# Patient Record
Sex: FEMALE | Race: WHITE | NOT HISPANIC OR LATINO | Employment: UNEMPLOYED | URBAN - METROPOLITAN AREA
[De-identification: names, ages, dates, MRNs, and addresses within clinical notes are randomized per-mention and may not be internally consistent; named-entity substitution may affect disease eponyms.]

---

## 2019-05-16 ENCOUNTER — OFFICE VISIT (OUTPATIENT)
Dept: URGENT CARE | Facility: CLINIC | Age: 39
End: 2019-05-16
Payer: OTHER GOVERNMENT

## 2019-05-16 VITALS
HEART RATE: 80 BPM | SYSTOLIC BLOOD PRESSURE: 114 MMHG | RESPIRATION RATE: 20 BRPM | OXYGEN SATURATION: 99 % | TEMPERATURE: 98 F | DIASTOLIC BLOOD PRESSURE: 70 MMHG

## 2019-05-16 DIAGNOSIS — H60.502 ACUTE OTITIS EXTERNA OF LEFT EAR, UNSPECIFIED TYPE: ICD-10-CM

## 2019-05-16 DIAGNOSIS — H65.92 LEFT SEROUS OTITIS MEDIA, UNSPECIFIED CHRONICITY: Primary | ICD-10-CM

## 2019-05-16 PROCEDURE — 99213 OFFICE O/P EST LOW 20 MIN: CPT | Performed by: PHYSICIAN ASSISTANT

## 2019-05-16 RX ORDER — AMOXICILLIN 875 MG/1
875 TABLET, COATED ORAL 2 TIMES DAILY
Qty: 20 TABLET | Refills: 0 | Status: SHIPPED | OUTPATIENT
Start: 2019-05-16 | End: 2019-05-23

## 2019-05-16 RX ORDER — OFLOXACIN 3 MG/ML
10 SOLUTION AURICULAR (OTIC) DAILY
Qty: 5 ML | Refills: 0 | Status: SHIPPED | OUTPATIENT
Start: 2019-05-16 | End: 2019-06-12 | Stop reason: ALTCHOICE

## 2019-06-06 ENCOUNTER — OFFICE VISIT (OUTPATIENT)
Dept: URGENT CARE | Facility: CLINIC | Age: 39
End: 2019-06-06
Payer: OTHER GOVERNMENT

## 2019-06-06 VITALS
SYSTOLIC BLOOD PRESSURE: 125 MMHG | RESPIRATION RATE: 18 BRPM | DIASTOLIC BLOOD PRESSURE: 70 MMHG | HEART RATE: 92 BPM | OXYGEN SATURATION: 100 % | TEMPERATURE: 98.3 F

## 2019-06-06 DIAGNOSIS — B37.0 THRUSH: Primary | ICD-10-CM

## 2019-06-06 PROCEDURE — 87102 FUNGUS ISOLATION CULTURE: CPT | Performed by: PHYSICIAN ASSISTANT

## 2019-06-06 PROCEDURE — G0382 LEV 3 HOSP TYPE B ED VISIT: HCPCS | Performed by: PHYSICIAN ASSISTANT

## 2019-06-06 PROCEDURE — 87106 FUNGI IDENTIFICATION YEAST: CPT | Performed by: PHYSICIAN ASSISTANT

## 2019-06-12 ENCOUNTER — OFFICE VISIT (OUTPATIENT)
Dept: INTERNAL MEDICINE CLINIC | Facility: CLINIC | Age: 39
End: 2019-06-12
Payer: OTHER GOVERNMENT

## 2019-06-12 VITALS
WEIGHT: 143.8 LBS | TEMPERATURE: 97.6 F | OXYGEN SATURATION: 96 % | RESPIRATION RATE: 16 BRPM | HEIGHT: 67 IN | BODY MASS INDEX: 22.57 KG/M2 | HEART RATE: 83 BPM | SYSTOLIC BLOOD PRESSURE: 112 MMHG | DIASTOLIC BLOOD PRESSURE: 72 MMHG

## 2019-06-12 DIAGNOSIS — Z12.31 ENCOUNTER FOR SCREENING MAMMOGRAM FOR BREAST CANCER: ICD-10-CM

## 2019-06-12 DIAGNOSIS — E55.9 VITAMIN D DEFICIENCY: ICD-10-CM

## 2019-06-12 DIAGNOSIS — T78.40XD ALLERGIC STATE, SUBSEQUENT ENCOUNTER: ICD-10-CM

## 2019-06-12 DIAGNOSIS — Z98.84 H/O GASTRIC BYPASS: ICD-10-CM

## 2019-06-12 DIAGNOSIS — Z13.220 SCREENING FOR LIPID DISORDERS: ICD-10-CM

## 2019-06-12 DIAGNOSIS — Z00.00 ENCOUNTER FOR PREVENTATIVE ADULT HEALTH CARE EXAMINATION: Primary | ICD-10-CM

## 2019-06-12 DIAGNOSIS — Z12.4 SCREENING FOR CERVICAL CANCER: ICD-10-CM

## 2019-06-12 DIAGNOSIS — Z13.0 SCREENING FOR DEFICIENCY ANEMIA: ICD-10-CM

## 2019-06-12 DIAGNOSIS — Z13.1 SCREENING FOR DIABETES MELLITUS: ICD-10-CM

## 2019-06-12 DIAGNOSIS — G25.81 RESTLESS LEG: ICD-10-CM

## 2019-06-12 DIAGNOSIS — Z13.29 SCREENING FOR THYROID DISORDER: ICD-10-CM

## 2019-06-12 DIAGNOSIS — M79.7 FIBROMYALGIA: ICD-10-CM

## 2019-06-12 DIAGNOSIS — F17.200 TOBACCO USE DISORDER: ICD-10-CM

## 2019-06-12 PROBLEM — G47.09 OTHER INSOMNIA: Status: ACTIVE | Noted: 2019-06-12

## 2019-06-12 PROBLEM — Z90.49 HISTORY OF CHOLECYSTECTOMY: Status: ACTIVE | Noted: 2019-06-12

## 2019-06-12 LAB — FUNGUS SPEC CULT: ABNORMAL

## 2019-06-12 PROCEDURE — 99204 OFFICE O/P NEW MOD 45 MIN: CPT | Performed by: NURSE PRACTITIONER

## 2019-06-12 RX ORDER — FLUTICASONE PROPIONATE 50 MCG
1 SPRAY, SUSPENSION (ML) NASAL DAILY
Qty: 1 BOTTLE | Refills: 5 | Status: SHIPPED | OUTPATIENT
Start: 2019-06-12 | End: 2020-01-27

## 2019-06-12 RX ORDER — DIPHENOXYLATE HYDROCHLORIDE AND ATROPINE SULFATE 2.5; .025 MG/1; MG/1
1 TABLET ORAL DAILY
COMMUNITY

## 2019-06-12 RX ORDER — MONTELUKAST SODIUM 10 MG/1
10 TABLET ORAL
Qty: 90 TABLET | Refills: 3 | Status: SHIPPED | OUTPATIENT
Start: 2019-06-12 | End: 2020-02-21 | Stop reason: ALTCHOICE

## 2019-06-12 RX ORDER — GREEN TEA/HOODIA GORDONII 315-12.5MG
CAPSULE ORAL
COMMUNITY

## 2019-06-12 RX ORDER — LORATADINE 10 MG/1
10 TABLET ORAL DAILY
Qty: 30 TABLET | Refills: 3 | Status: SHIPPED | OUTPATIENT
Start: 2019-06-12 | End: 2020-01-27 | Stop reason: SDUPTHER

## 2019-06-13 ENCOUNTER — TELEPHONE (OUTPATIENT)
Dept: INTERNAL MEDICINE CLINIC | Facility: CLINIC | Age: 39
End: 2019-06-13

## 2019-06-13 DIAGNOSIS — B37.0 THRUSH: ICD-10-CM

## 2019-06-14 DIAGNOSIS — B37.0 THRUSH: Primary | ICD-10-CM

## 2019-10-10 ENCOUNTER — OFFICE VISIT (OUTPATIENT)
Dept: INTERNAL MEDICINE CLINIC | Facility: CLINIC | Age: 39
End: 2019-10-10
Payer: OTHER GOVERNMENT

## 2019-10-10 VITALS
DIASTOLIC BLOOD PRESSURE: 90 MMHG | OXYGEN SATURATION: 98 % | WEIGHT: 145.38 LBS | SYSTOLIC BLOOD PRESSURE: 100 MMHG | BODY MASS INDEX: 22.82 KG/M2 | TEMPERATURE: 97.3 F | HEIGHT: 67 IN | HEART RATE: 108 BPM

## 2019-10-10 DIAGNOSIS — R14.0 ABDOMINAL BLOATING: ICD-10-CM

## 2019-10-10 DIAGNOSIS — K52.9 CHRONIC DIARRHEA: ICD-10-CM

## 2019-10-10 DIAGNOSIS — Z13.220 SCREENING FOR LIPID DISORDERS: ICD-10-CM

## 2019-10-10 DIAGNOSIS — Z98.84 S/P GASTRIC BYPASS: ICD-10-CM

## 2019-10-10 DIAGNOSIS — R19.5 CHANGE IN STOOL: Primary | ICD-10-CM

## 2019-10-10 PROCEDURE — 99214 OFFICE O/P EST MOD 30 MIN: CPT | Performed by: INTERNAL MEDICINE

## 2019-10-10 RX ORDER — CHOLESTYRAMINE 4 G/9G
2 POWDER, FOR SUSPENSION ORAL
Qty: 378 G | Refills: 1 | Status: SHIPPED | OUTPATIENT
Start: 2019-10-10 | End: 2020-02-21 | Stop reason: ALTCHOICE

## 2019-10-10 NOTE — PROGRESS NOTES
Assessment/Plan:  Problem List Items Addressed This Visit     None      Visit Diagnoses     Change in stool    -  Primary    Relevant Medications    cholestyramine (QUESTRAN) 4 GM/DOSE powder    Other Relevant Orders    CBC and differential    Comprehensive metabolic panel    TSH, 3rd generation    Celiac Disease Antibody Profile    XR abdomen obstruction series    Chronic diarrhea        Relevant Medications    cholestyramine (QUESTRAN) 4 GM/DOSE powder    Other Relevant Orders    CBC and differential    Comprehensive metabolic panel    Celiac Disease Antibody Profile    Clostridium difficile toxin by PCR    Fecal fat, qualitative    Fecal fat, quantitative    Fecal leukocytes    Giardia antigen    Ova and parasite examination    Stool Enteric Bacterial Panel by PCR    White Blood Cells, Stool by Gram Stain    XR abdomen obstruction series    S/P gastric bypass        Relevant Orders    Ferritin    Iron    TIBC    Vitamin A    Vitamin B1, whole blood    Vitamin B12/Folate, Serum Panel    Vitamin B6    Vitamin D 25 hydroxy    XR abdomen obstruction series    Abdominal bloating        Relevant Medications    cholestyramine (QUESTRAN) 4 GM/DOSE powder    Other Relevant Orders    XR abdomen obstruction series    Screening for lipid disorders        Relevant Orders    Lipid Panel with Direct LDL reflex           Diagnoses and all orders for this visit:    Change in stool  -     CBC and differential; Future  -     Comprehensive metabolic panel; Future  -     TSH, 3rd generation; Future  -     Celiac Disease Antibody Profile; Future  -     XR abdomen obstruction series; Future  -     cholestyramine (QUESTRAN) 4 GM/DOSE powder; Take 0 5 packets (2 g total) by mouth 3 (three) times a day with meals    Chronic diarrhea  -     CBC and differential; Future  -     Comprehensive metabolic panel; Future  -     Celiac Disease Antibody Profile;  Future  -     Clostridium difficile toxin by PCR  -     Fecal fat, qualitative; Future  -     Fecal fat, quantitative; Future  -     Fecal leukocytes; Future  -     Giardia antigen; Future  -     Ova and parasite examination; Future  -     Stool Enteric Bacterial Panel by PCR; Future  -     White Blood Cells, Stool by Gram Stain; Future  -     XR abdomen obstruction series; Future  -     cholestyramine (QUESTRAN) 4 GM/DOSE powder; Take 0 5 packets (2 g total) by mouth 3 (three) times a day with meals    S/P gastric bypass  -     Ferritin; Future  -     Iron; Future  -     TIBC; Future  -     Vitamin A; Future  -     Vitamin B1, whole blood; Future  -     Vitamin B12/Folate, Serum Panel; Future  -     Vitamin B6; Future  -     Vitamin D 25 hydroxy; Future  -     XR abdomen obstruction series; Future    Abdominal bloating  -     XR abdomen obstruction series; Future  -     cholestyramine (QUESTRAN) 4 GM/DOSE powder; Take 0 5 packets (2 g total) by mouth 3 (three) times a day with meals    Screening for lipid disorders  -     Lipid Panel with Direct LDL reflex; Future        No problem-specific Assessment & Plan notes found for this encounter  A/P: ??Cause  PE unimpressive  Late for post choly sydrome to start, but still in the DDx  ? infectious, malabsorption(celiac), IBD or given the fibro, IBS or dumping syndrome from the bypass, but would have expected this closer to the sx date as well  Doubt obstruction  Will check labs including routine and for gastric bypass pt since she is way overdue, for diarrhea, along with stool studies  Check an obstruction series  Trial of questran  May need a colonoscopy or BE  RTC two weeks for f/u  Subjective:      Patient ID: Mike Borrero is a 35 y o  female  WF with h/o gastric bypass, s/p choly  and fibromyalgia, presents with  a5-6 month h/o stool changes with associated abdominal bloating and cramping  Mainly at night  Reports several stools with the initial one being very soft and formed, but the following being watery diarrhea   Reports foul smelling and foamy, but no greasy or fatty deposits  No BRBPR, melena, hematochezia, etc  No fever or chills  No n/v  Lots of flatulence  NO one else affected  Drinks bottled water  No abx use  No change in diet or meds  Has not had labs in a long time  No Fhx of IBD/IBS, but some celiac disease  Bypass and choly years ago  No wt changes  No relationship to certain foods  Denies any MATTIE changes  Has tried [employer] and OTC and anti-diarrhea meds w/o relief  The following portions of the patient's history were reviewed and updated as appropriate:   She has a past medical history of Fibromyalgia and Restless leg syndrome  ,  does not have any pertinent problems on file  ,   has a past surgical history that includes Cholecystectomy and Gastric bypass  ,  family history includes Anxiety disorder in her mother; Dementia in her paternal grandmother; Heart disease in her mother; No Known Problems in her father  ,   reports that she has been smoking  She has never used smokeless tobacco  She reports that she drank alcohol  She reports that she does not use drugs  ,  has No Known Allergies     Current Outpatient Medications   Medication Sig Dispense Refill    cholestyramine (QUESTRAN) 4 GM/DOSE powder Take 0 5 packets (2 g total) by mouth 3 (three) times a day with meals 378 g 1    fluticasone (FLONASE) 50 mcg/act nasal spray 1 spray into each nostril daily for 30 days 1 Bottle 5    Lactobacillus (PROBIOTIC ACIDOPHILUS) TABS Take by mouth      loratadine (CLARITIN) 10 mg tablet Take 1 tablet (10 mg total) by mouth daily for 30 days 30 tablet 3    montelukast (SINGULAIR) 10 mg tablet Take 1 tablet (10 mg total) by mouth daily at bedtime for 90 days 90 tablet 3    multivitamin (THERAGRAN) TABS Take 1 tablet by mouth daily       No current facility-administered medications for this visit  Review of Systems   Constitutional: Negative for activity change, chills, diaphoresis, fatigue and fever     Respiratory: Negative for cough, chest tightness, shortness of breath and wheezing  Cardiovascular: Negative for chest pain, palpitations and leg swelling  Gastrointestinal: Positive for abdominal distention, abdominal pain and diarrhea  Negative for anal bleeding, blood in stool, constipation, nausea, rectal pain and vomiting  Endocrine: Negative for cold intolerance and heat intolerance  Genitourinary: Negative for difficulty urinating, dysuria and frequency  Musculoskeletal: Negative for arthralgias, gait problem and myalgias  Neurological: Negative for dizziness, tremors, seizures, weakness, light-headedness and headaches  Psychiatric/Behavioral: Positive for sleep disturbance  Negative for confusion and dysphoric mood  The patient is not nervous/anxious  PHQ-9 Depression Screening    PHQ-9:    Frequency of the following problems over the past two weeks:       Little interest or pleasure in doing things:  0 - not at all  Feeling down, depressed, or hopeless:  0 - not at all  PHQ-2 Score:  0        Objective:  Vitals:    10/10/19 1530   BP: 100/90   Pulse: (!) 108   Temp: (!) 97 3 °F (36 3 °C)   SpO2: 98%   Weight: 65 9 kg (145 lb 6 oz)   Height: 5' 6 5" (1 689 m)     Body mass index is 23 11 kg/m²  Physical Exam   Constitutional: She is oriented to person, place, and time  She appears well-developed and well-nourished  No distress  HENT:   Head: Normocephalic and atraumatic  Mouth/Throat: Oropharynx is clear and moist    Eyes: Pupils are equal, round, and reactive to light  Conjunctivae and EOM are normal  No scleral icterus  Neck: Neck supple  Cardiovascular: Normal rate, regular rhythm and normal heart sounds  Pulmonary/Chest: Effort normal and breath sounds normal  No respiratory distress  She has no wheezes  She has no rales  Abdominal: Soft  Bowel sounds are normal  She exhibits no distension and no mass  There is no tenderness  There is no rebound and no guarding  Lymphadenopathy:     She has no cervical adenopathy  Neurological: She is alert and oriented to person, place, and time  Psychiatric: She has a normal mood and affect  Her behavior is normal  Judgment and thought content normal    Nursing note and vitals reviewed

## 2019-10-11 ENCOUNTER — APPOINTMENT (OUTPATIENT)
Dept: LAB | Facility: CLINIC | Age: 39
End: 2019-10-11
Payer: OTHER GOVERNMENT

## 2019-10-11 ENCOUNTER — APPOINTMENT (OUTPATIENT)
Dept: RADIOLOGY | Facility: CLINIC | Age: 39
End: 2019-10-11
Payer: OTHER GOVERNMENT

## 2019-10-11 ENCOUNTER — TRANSCRIBE ORDERS (OUTPATIENT)
Dept: LAB | Facility: CLINIC | Age: 39
End: 2019-10-11

## 2019-10-11 DIAGNOSIS — Z98.84 S/P GASTRIC BYPASS: ICD-10-CM

## 2019-10-11 DIAGNOSIS — Z13.29 SCREENING FOR THYROID DISORDER: ICD-10-CM

## 2019-10-11 DIAGNOSIS — K52.9 CHRONIC DIARRHEA: ICD-10-CM

## 2019-10-11 DIAGNOSIS — R19.5 CHANGE IN STOOL: ICD-10-CM

## 2019-10-11 DIAGNOSIS — R14.0 ABDOMINAL BLOATING: ICD-10-CM

## 2019-10-11 DIAGNOSIS — Z13.220 SCREENING FOR LIPID DISORDERS: ICD-10-CM

## 2019-10-11 DIAGNOSIS — Z98.84 S/P GASTRIC BYPASS: Primary | ICD-10-CM

## 2019-10-11 LAB
25(OH)D3 SERPL-MCNC: 47.7 NG/ML (ref 30–100)
ALBUMIN SERPL BCP-MCNC: 3.6 G/DL (ref 3.5–5)
ALP SERPL-CCNC: 99 U/L (ref 46–116)
ALT SERPL W P-5'-P-CCNC: 23 U/L (ref 12–78)
ANION GAP SERPL CALCULATED.3IONS-SCNC: 7 MMOL/L (ref 4–13)
AST SERPL W P-5'-P-CCNC: 11 U/L (ref 5–45)
BASOPHILS # BLD AUTO: 0.06 THOUSANDS/ΜL (ref 0–0.1)
BASOPHILS NFR BLD AUTO: 1 % (ref 0–1)
BILIRUB SERPL-MCNC: 0.38 MG/DL (ref 0.2–1)
BUN SERPL-MCNC: 9 MG/DL (ref 5–25)
CALCIUM SERPL-MCNC: 9 MG/DL (ref 8.3–10.1)
CHLORIDE SERPL-SCNC: 107 MMOL/L (ref 100–108)
CHOLEST SERPL-MCNC: 120 MG/DL (ref 50–200)
CO2 SERPL-SCNC: 26 MMOL/L (ref 21–32)
CREAT SERPL-MCNC: 0.69 MG/DL (ref 0.6–1.3)
EOSINOPHIL # BLD AUTO: 0.09 THOUSAND/ΜL (ref 0–0.61)
EOSINOPHIL NFR BLD AUTO: 1 % (ref 0–6)
ERYTHROCYTE [DISTWIDTH] IN BLOOD BY AUTOMATED COUNT: 21.5 % (ref 11.6–15.1)
FERRITIN SERPL-MCNC: 3 NG/ML (ref 8–388)
FOLATE SERPL-MCNC: >20 NG/ML (ref 3.1–17.5)
GFR SERPL CREATININE-BSD FRML MDRD: 109 ML/MIN/1.73SQ M
GLUCOSE P FAST SERPL-MCNC: 84 MG/DL (ref 65–99)
HCT VFR BLD AUTO: 33 % (ref 34.8–46.1)
HDLC SERPL-MCNC: 45 MG/DL (ref 40–60)
HGB BLD-MCNC: 9.6 G/DL (ref 11.5–15.4)
IMM GRANULOCYTES # BLD AUTO: 0.02 THOUSAND/UL (ref 0–0.2)
IMM GRANULOCYTES NFR BLD AUTO: 0 % (ref 0–2)
IRON SERPL-MCNC: 14 UG/DL (ref 50–170)
LDLC SERPL CALC-MCNC: 65 MG/DL (ref 0–100)
LYMPHOCYTES # BLD AUTO: 3.95 THOUSANDS/ΜL (ref 0.6–4.47)
LYMPHOCYTES NFR BLD AUTO: 44 % (ref 14–44)
MCH RBC QN AUTO: 20.2 PG (ref 26.8–34.3)
MCHC RBC AUTO-ENTMCNC: 29.1 G/DL (ref 31.4–37.4)
MCV RBC AUTO: 69 FL (ref 82–98)
MONOCYTES # BLD AUTO: 0.46 THOUSAND/ΜL (ref 0.17–1.22)
MONOCYTES NFR BLD AUTO: 5 % (ref 4–12)
NEUTROPHILS # BLD AUTO: 4.36 THOUSANDS/ΜL (ref 1.85–7.62)
NEUTS SEG NFR BLD AUTO: 49 % (ref 43–75)
NRBC BLD AUTO-RTO: 0 /100 WBCS
PLATELET # BLD AUTO: 249 THOUSANDS/UL (ref 149–390)
PMV BLD AUTO: 10.7 FL (ref 8.9–12.7)
POTASSIUM SERPL-SCNC: 3.4 MMOL/L (ref 3.5–5.3)
PROT SERPL-MCNC: 7.3 G/DL (ref 6.4–8.2)
RBC # BLD AUTO: 4.76 MILLION/UL (ref 3.81–5.12)
SODIUM SERPL-SCNC: 140 MMOL/L (ref 136–145)
TIBC SERPL-MCNC: 411 UG/DL (ref 250–450)
TRIGL SERPL-MCNC: 48 MG/DL
TSH SERPL DL<=0.05 MIU/L-ACNC: 3.29 UIU/ML (ref 0.36–3.74)
VIT B12 SERPL-MCNC: 356 PG/ML (ref 100–900)
WBC # BLD AUTO: 8.94 THOUSAND/UL (ref 4.31–10.16)

## 2019-10-11 PROCEDURE — 74022 RADEX COMPL AQT ABD SERIES: CPT

## 2019-10-11 PROCEDURE — 80061 LIPID PANEL: CPT

## 2019-10-11 PROCEDURE — 84590 ASSAY OF VITAMIN A: CPT

## 2019-10-11 PROCEDURE — 36415 COLL VENOUS BLD VENIPUNCTURE: CPT

## 2019-10-11 PROCEDURE — 84425 ASSAY OF VITAMIN B-1: CPT

## 2019-10-11 PROCEDURE — 83550 IRON BINDING TEST: CPT

## 2019-10-11 PROCEDURE — 82746 ASSAY OF FOLIC ACID SERUM: CPT

## 2019-10-11 PROCEDURE — 82728 ASSAY OF FERRITIN: CPT

## 2019-10-11 PROCEDURE — 82306 VITAMIN D 25 HYDROXY: CPT

## 2019-10-11 PROCEDURE — 83540 ASSAY OF IRON: CPT

## 2019-10-11 PROCEDURE — 84207 ASSAY OF VITAMIN B-6: CPT

## 2019-10-11 PROCEDURE — 86255 FLUORESCENT ANTIBODY SCREEN: CPT

## 2019-10-11 PROCEDURE — 82784 ASSAY IGA/IGD/IGG/IGM EACH: CPT

## 2019-10-11 PROCEDURE — 83516 IMMUNOASSAY NONANTIBODY: CPT

## 2019-10-11 PROCEDURE — 85025 COMPLETE CBC W/AUTO DIFF WBC: CPT

## 2019-10-11 PROCEDURE — 80053 COMPREHEN METABOLIC PANEL: CPT

## 2019-10-11 PROCEDURE — 82607 VITAMIN B-12: CPT

## 2019-10-11 PROCEDURE — 84443 ASSAY THYROID STIM HORMONE: CPT

## 2019-10-12 LAB
ENDOMYSIUM IGA SER QL: NEGATIVE
GLIADIN PEPTIDE IGA SER-ACNC: 11 UNITS (ref 0–19)
GLIADIN PEPTIDE IGG SER-ACNC: 3 UNITS (ref 0–19)
IGA SERPL-MCNC: 339 MG/DL (ref 87–352)
TTG IGA SER-ACNC: <2 U/ML (ref 0–3)
TTG IGG SER-ACNC: <2 U/ML (ref 0–5)

## 2019-10-15 LAB
VIT A SERPL-MCNC: 34.2 UG/DL (ref 20.1–62)
VIT B6 SERPL-MCNC: 7.2 UG/L (ref 2–32.8)

## 2019-10-16 LAB — VIT B1 BLD-SCNC: 138.8 NMOL/L (ref 66.5–200)

## 2019-10-25 ENCOUNTER — OFFICE VISIT (OUTPATIENT)
Dept: INTERNAL MEDICINE CLINIC | Facility: CLINIC | Age: 39
End: 2019-10-25
Payer: OTHER GOVERNMENT

## 2019-10-25 VITALS
BODY MASS INDEX: 22.13 KG/M2 | WEIGHT: 141 LBS | DIASTOLIC BLOOD PRESSURE: 62 MMHG | RESPIRATION RATE: 14 BRPM | TEMPERATURE: 97.6 F | HEIGHT: 67 IN | SYSTOLIC BLOOD PRESSURE: 110 MMHG | HEART RATE: 72 BPM

## 2019-10-25 DIAGNOSIS — K58.0 IRRITABLE BOWEL SYNDROME WITH DIARRHEA: ICD-10-CM

## 2019-10-25 DIAGNOSIS — D50.8 IRON DEFICIENCY ANEMIA SECONDARY TO INADEQUATE DIETARY IRON INTAKE: Primary | ICD-10-CM

## 2019-10-25 DIAGNOSIS — E87.6 HYPOKALEMIA: ICD-10-CM

## 2019-10-25 DIAGNOSIS — Z98.84 H/O GASTRIC BYPASS: ICD-10-CM

## 2019-10-25 PROCEDURE — 99214 OFFICE O/P EST MOD 30 MIN: CPT | Performed by: INTERNAL MEDICINE

## 2019-10-25 RX ORDER — FERROUS SULFATE TAB EC 324 MG (65 MG FE EQUIVALENT) 324 (65 FE) MG
324 TABLET DELAYED RESPONSE ORAL
Qty: 180 TABLET | Refills: 1 | Status: SHIPPED | OUTPATIENT
Start: 2019-10-25 | End: 2020-02-27 | Stop reason: ALTCHOICE

## 2019-10-25 NOTE — PROGRESS NOTES
Assessment/Plan:  Problem List Items Addressed This Visit        Other    H/O gastric bypass    Relevant Medications    ferrous sulfate 324 (65 Fe) mg      Other Visit Diagnoses     Iron deficiency anemia secondary to inadequate dietary iron intake    -  Primary    Relevant Medications    ferrous sulfate 324 (65 Fe) mg    Hypokalemia        Irritable bowel syndrome with diarrhea               Diagnoses and all orders for this visit:    Iron deficiency anemia secondary to inadequate dietary iron intake  -     ferrous sulfate 324 (65 Fe) mg; Take 1 tablet (324 mg total) by mouth 2 (two) times a day before meals    H/O gastric bypass  -     ferrous sulfate 324 (65 Fe) mg; Take 1 tablet (324 mg total) by mouth 2 (two) times a day before meals    Hypokalemia    Irritable bowel syndrome with diarrhea    Other orders  -     Calcium-Phosphorus-Vitamin D (CITRACAL +D3 PO); Take by mouth        No problem-specific Assessment & Plan notes found for this encounter  A/P: Doing better and will continue the Hannah  Discussed labs and imaging  Will check FIT test and start oral replacement iron  Will recheck in three months and if persists, will need a scope and IV iron  RTC three months for routine/f/u  Subjective:      Patient ID: Kj Vidal is a 35 y o  female  WF RTC for f/u chronic diarrhea and labs  Uncertain to the diarrhea, but felt to be possibly to gastric bypass, IBS, or s/p choly  Started on Hannah and reports diarrhea is gone as long as she takes the medicine  Obstruction series was normal  Labs showed severe EVELINA most likely due to her gastric bypass  Denies any dietary changes and no bleeding that she knows of  No black tarry stools, melena, etc  No hematuria or heavy menses  No CP or SOB, but some fatigue and feeling cold all the time         The following portions of the patient's history were reviewed and updated as appropriate:   She has a past medical history of Fibromyalgia and Restless leg syndrome  ,  does not have any pertinent problems on file  ,   has a past surgical history that includes Cholecystectomy and Gastric bypass  ,  family history includes Anxiety disorder in her mother; Dementia in her paternal grandmother; Heart disease in her mother; No Known Problems in her father  ,   reports that she has been smoking  She has never used smokeless tobacco  She reports that she drank alcohol  She reports that she does not use drugs  ,  has No Known Allergies     Current Outpatient Medications   Medication Sig Dispense Refill    Calcium-Phosphorus-Vitamin D (CITRACAL +D3 PO) Take by mouth      cholestyramine (QUESTRAN) 4 GM/DOSE powder Take 0 5 packets (2 g total) by mouth 3 (three) times a day with meals 378 g 1    fluticasone (FLONASE) 50 mcg/act nasal spray 1 spray into each nostril daily for 30 days 1 Bottle 5    Lactobacillus (PROBIOTIC ACIDOPHILUS) TABS Take by mouth      loratadine (CLARITIN) 10 mg tablet Take 1 tablet (10 mg total) by mouth daily for 30 days 30 tablet 3    montelukast (SINGULAIR) 10 mg tablet Take 1 tablet (10 mg total) by mouth daily at bedtime for 90 days 90 tablet 3    multivitamin (THERAGRAN) TABS Take 1 tablet by mouth daily      ferrous sulfate 324 (65 Fe) mg Take 1 tablet (324 mg total) by mouth 2 (two) times a day before meals 180 tablet 1     No current facility-administered medications for this visit  Review of Systems   Constitutional: Positive for fatigue  Negative for activity change, chills, diaphoresis and fever  Respiratory: Negative for cough, chest tightness, shortness of breath and wheezing  Cardiovascular: Negative for chest pain, palpitations and leg swelling  Gastrointestinal: Negative for abdominal pain, constipation, diarrhea, nausea and vomiting  Endocrine: Positive for cold intolerance  Negative for heat intolerance  Genitourinary: Negative for difficulty urinating, dysuria and frequency     Musculoskeletal: Negative for arthralgias, gait problem and myalgias  Neurological: Negative for dizziness, seizures, syncope, weakness, light-headedness and headaches  Psychiatric/Behavioral: Negative for confusion  The patient is not nervous/anxious  PHQ-9 Depression Screening    PHQ-9:    Frequency of the following problems over the past two weeks:       Little interest or pleasure in doing things:  0 - not at all  Feeling down, depressed, or hopeless:  0 - not at all  PHQ-2 Score:  0        Objective:  Vitals:    10/25/19 1158   BP: 110/62   Pulse: 72   Resp: 14   Temp: 97 6 °F (36 4 °C)   TempSrc: Tympanic   Weight: 64 kg (141 lb)   Height: 5' 6 5" (1 689 m)     Body mass index is 22 42 kg/m²  Physical Exam   Constitutional: She is oriented to person, place, and time  She appears well-developed and well-nourished  No distress  HENT:   Head: Normocephalic and atraumatic  Mouth/Throat: Oropharynx is clear and moist    Eyes: Pupils are equal, round, and reactive to light  Conjunctivae and EOM are normal    Cardiovascular: Normal rate, regular rhythm and normal heart sounds  Pulmonary/Chest: Effort normal and breath sounds normal  No respiratory distress  She has no wheezes  She has no rales  Abdominal: Soft  Bowel sounds are normal  She exhibits no distension  There is no tenderness  Neurological: She is alert and oriented to person, place, and time  Psychiatric: She has a normal mood and affect  Her behavior is normal  Judgment and thought content normal    Nursing note and vitals reviewed

## 2019-11-05 ENCOUNTER — OFFICE VISIT (OUTPATIENT)
Dept: URGENT CARE | Facility: CLINIC | Age: 39
End: 2019-11-05
Payer: OTHER GOVERNMENT

## 2019-11-05 ENCOUNTER — APPOINTMENT (OUTPATIENT)
Dept: RADIOLOGY | Facility: CLINIC | Age: 39
End: 2019-11-05
Payer: OTHER GOVERNMENT

## 2019-11-05 VITALS
BODY MASS INDEX: 22.66 KG/M2 | HEIGHT: 66 IN | RESPIRATION RATE: 18 BRPM | SYSTOLIC BLOOD PRESSURE: 134 MMHG | DIASTOLIC BLOOD PRESSURE: 95 MMHG | TEMPERATURE: 97 F | WEIGHT: 141 LBS | HEART RATE: 97 BPM | OXYGEN SATURATION: 100 %

## 2019-11-05 DIAGNOSIS — M54.6 ACUTE MIDLINE THORACIC BACK PAIN: ICD-10-CM

## 2019-11-05 DIAGNOSIS — M54.6 ACUTE MIDLINE THORACIC BACK PAIN: Primary | ICD-10-CM

## 2019-11-05 PROCEDURE — 99213 OFFICE O/P EST LOW 20 MIN: CPT | Performed by: PHYSICIAN ASSISTANT

## 2019-11-05 PROCEDURE — 72072 X-RAY EXAM THORAC SPINE 3VWS: CPT

## 2019-11-05 RX ORDER — PREDNISONE 10 MG/1
TABLET ORAL
Qty: 26 TABLET | Refills: 0 | Status: SHIPPED | OUTPATIENT
Start: 2019-11-05 | End: 2020-02-21 | Stop reason: ALTCHOICE

## 2019-11-05 RX ORDER — METAXALONE 800 MG/1
800 TABLET ORAL 3 TIMES DAILY
Qty: 20 TABLET | Refills: 0 | Status: SHIPPED | OUTPATIENT
Start: 2019-11-05 | End: 2020-01-27

## 2019-11-06 NOTE — PATIENT INSTRUCTIONS
Xray appears negative for any fracture  Will follow up with radiologist report when available  Start prednisone to help reduce inflammation  Take as directed  Muscle relaxer as needed  Do not take muscle relaxer if you're going to be driving and do not take with alcohol  Apply ice to the area 3-4 times daily for 20-30 minutes  After 2-3 days can start using moist heat a few times a day and do gentle stretches  If not improving over the next week, follow up with PCP or orthopedics

## 2019-11-06 NOTE — PROGRESS NOTES
[address] [facility]    NAME: Ira Logan is a 35 y o  female  : 1980    MRN: [id number]  DATE: 2019  TIME: 8:06 PM    Assessment and Plan   Acute midline thoracic back pain [M54 6]  1  Acute midline thoracic back pain  XR spine thoracic 3 vw    predniSONE 10 mg tablet    metaxalone (SKELAXIN) 800 mg tablet       Patient Instructions   Patient Instructions   Xray appears negative for any fracture  Will follow up with radiologist report when available  Start prednisone to help reduce inflammation  Take as directed  Muscle relaxer as needed  Do not take muscle relaxer if you're going to be driving and do not take with alcohol  Apply ice to the area 3-4 times daily for 20-30 minutes  After 2-3 days can start using moist heat a few times a day and do gentle stretches  If not improving over the next week, follow up with PCP or orthopedics  Chief Complaint     Chief Complaint   Patient presents with    Back Pain     Patient c/o back pain 6/10 that started a week ago, denies injury       History of Present Illness   50-year-old female here with complaint thoracic back pain  Patient states that it has been there intermittently for quite some time over the last few days has been worse  Started after she was cleaning her house  Initially thought it was just a flare-up of her fibromyalgia but the pain has not been improving  Has been managed a little bit with CBD to while and with some mild stretches but it comes right back  No fever chills  Denies any shortness of breath  Has seen a chiropractor in the past for this  Denies any related numbness or tingling  Review of Systems   Review of Systems   Constitutional: Negative for chills and fever  HENT: Negative for congestion  Respiratory: Negative for cough and shortness of breath  Cardiovascular: Negative for chest pain  Musculoskeletal: Positive for back pain (Thoracic)     Neurological: Negative for weakness and numbness         Current Medications     Current Outpatient Medications:     Calcium-Phosphorus-Vitamin D (CITRACAL +D3 PO), Take by mouth, Disp: , Rfl:     cholestyramine (QUESTRAN) 4 GM/DOSE powder, Take 0 5 packets (2 g total) by mouth 3 (three) times a day with meals, Disp: 378 g, Rfl: 1    ferrous sulfate 324 (65 Fe) mg, Take 1 tablet (324 mg total) by mouth 2 (two) times a day before meals, Disp: 180 tablet, Rfl: 1    fluticasone (FLONASE) 50 mcg/act nasal spray, 1 spray into each nostril daily for 30 days, Disp: 1 Bottle, Rfl: 5    Lactobacillus (PROBIOTIC ACIDOPHILUS) TABS, Take by mouth, Disp: , Rfl:     loratadine (CLARITIN) 10 mg tablet, Take 1 tablet (10 mg total) by mouth daily for 30 days, Disp: 30 tablet, Rfl: 3    montelukast (SINGULAIR) 10 mg tablet, Take 1 tablet (10 mg total) by mouth daily at bedtime for 90 days, Disp: 90 tablet, Rfl: 3    multivitamin (THERAGRAN) TABS, Take 1 tablet by mouth daily, Disp: , Rfl:     metaxalone (SKELAXIN) 800 mg tablet, Take 1 tablet (800 mg total) by mouth 3 (three) times a day, Disp: 20 tablet, Rfl: 0    predniSONE 10 mg tablet, Take 3 tabs BID X 2 days, 2 tabs BID X 2 days, 1 tab BID X 2 days, 1 tab daily X 2 days, Disp: 26 tablet, Rfl: 0    Current Allergies     Allergies as of 11/05/2019    (No Known Allergies)          The following portions of the patient's history were reviewed and updated as appropriate: allergies, current medications, past family history, past medical history, past social history, past surgical history and problem list    Past Medical History:   Diagnosis Date    Fibromyalgia     Restless leg syndrome      Past Surgical History:   Procedure Laterality Date    CHOLECYSTECTOMY      GASTRIC BYPASS       Family History   Problem Relation Age of Onset    Heart disease Mother     Anxiety disorder Mother     No Known Problems Father     Dementia Paternal Grandmother      Social History     Socioeconomic History    Marital status: /Civil Union     Spouse name: Not on file    Number of children: Not on file    Years of education: Not on file    Highest education level: Not on file   Occupational History    Not on file   Social Needs    Financial resource strain: Not on file    Food insecurity:     Worry: Not on file     Inability: Not on file    Transportation needs:     Medical: Not on file     Non-medical: Not on file   Tobacco Use    Smoking status: Current Every Day Smoker     Packs/day: 1 00     Years: 30 00     Pack years: 30 00    Smokeless tobacco: Never Used   Substance and Sexual Activity    Alcohol use: Not Currently     Frequency: Never    Drug use: Never    Sexual activity: Not on file   Lifestyle    Physical activity:     Days per week: Not on file     Minutes per session: Not on file    Stress: Not on file   Relationships    Social connections:     Talks on phone: Not on file     Gets together: Not on file     Attends [employer] service: Not on file     Active member of club or organization: Not on file     Attends meetings of clubs or organizations: Not on file     Relationship status: Not on file    Intimate partner violence:     Fear of current or ex partner: Not on file     Emotionally abused: Not on file     Physically abused: Not on file     Forced sexual activity: Not on file   Other Topics Concern    Not on file   Social History Narrative    Not on file     Medications have been verified  Objective   /95   Pulse 97   Temp (!) 97 °F (36 1 °C) (Tympanic)   Resp 18   Ht 5' 6" (1 676 m)   Wt 64 kg (141 lb)   LMP 10/29/2019   SpO2 100%   BMI 22 76 kg/m²      Physical Exam   Physical Exam   Constitutional: She appears well-developed and well-nourished  No distress  Cardiovascular: Normal rate, regular rhythm, normal heart sounds and intact distal pulses  Pulmonary/Chest: Effort normal and breath sounds normal  No respiratory distress     Musculoskeletal:        Cervical back: She exhibits normal range of motion and no tenderness  Thoracic back: She exhibits tenderness and spasm  She exhibits normal range of motion and no bony tenderness  Lumbar back: She exhibits normal range of motion and no tenderness  Back:    Neurological: She has normal strength and normal reflexes  No sensory deficit  Nursing note and vitals reviewed

## 2020-01-27 ENCOUNTER — APPOINTMENT (OUTPATIENT)
Dept: LAB | Facility: CLINIC | Age: 40
End: 2020-01-27
Payer: OTHER GOVERNMENT

## 2020-01-27 ENCOUNTER — OFFICE VISIT (OUTPATIENT)
Dept: INTERNAL MEDICINE CLINIC | Facility: CLINIC | Age: 40
End: 2020-01-27
Payer: OTHER GOVERNMENT

## 2020-01-27 VITALS
RESPIRATION RATE: 14 BRPM | SYSTOLIC BLOOD PRESSURE: 120 MMHG | WEIGHT: 146 LBS | TEMPERATURE: 97.6 F | DIASTOLIC BLOOD PRESSURE: 70 MMHG | BODY MASS INDEX: 23.46 KG/M2 | OXYGEN SATURATION: 100 % | HEIGHT: 66 IN | HEART RATE: 76 BPM

## 2020-01-27 DIAGNOSIS — R00.2 PALPITATION: ICD-10-CM

## 2020-01-27 DIAGNOSIS — Z11.4 SCREENING FOR HIV (HUMAN IMMUNODEFICIENCY VIRUS): ICD-10-CM

## 2020-01-27 DIAGNOSIS — M79.7 FIBROMYALGIA: ICD-10-CM

## 2020-01-27 DIAGNOSIS — J30.2 SEASONAL ALLERGIES: ICD-10-CM

## 2020-01-27 DIAGNOSIS — G47.09 OTHER INSOMNIA: ICD-10-CM

## 2020-01-27 DIAGNOSIS — F17.200 TOBACCO USE DISORDER: ICD-10-CM

## 2020-01-27 DIAGNOSIS — D50.8 IRON DEFICIENCY ANEMIA SECONDARY TO INADEQUATE DIETARY IRON INTAKE: ICD-10-CM

## 2020-01-27 DIAGNOSIS — T78.40XD ALLERGIC STATE, SUBSEQUENT ENCOUNTER: ICD-10-CM

## 2020-01-27 DIAGNOSIS — E55.9 VITAMIN D DEFICIENCY: ICD-10-CM

## 2020-01-27 DIAGNOSIS — F41.1 GAD (GENERALIZED ANXIETY DISORDER): ICD-10-CM

## 2020-01-27 DIAGNOSIS — M54.6 CHRONIC MIDLINE THORACIC BACK PAIN: ICD-10-CM

## 2020-01-27 DIAGNOSIS — Z23 ENCOUNTER FOR VACCINATION: ICD-10-CM

## 2020-01-27 DIAGNOSIS — G25.81 RESTLESS LEG: ICD-10-CM

## 2020-01-27 DIAGNOSIS — K58.0 IRRITABLE BOWEL SYNDROME WITH DIARRHEA: Primary | ICD-10-CM

## 2020-01-27 DIAGNOSIS — R07.89 ATYPICAL CHEST PAIN: ICD-10-CM

## 2020-01-27 DIAGNOSIS — G89.29 CHRONIC MIDLINE THORACIC BACK PAIN: ICD-10-CM

## 2020-01-27 LAB
BASOPHILS # BLD AUTO: 0.07 THOUSANDS/ΜL (ref 0–0.1)
BASOPHILS NFR BLD AUTO: 1 % (ref 0–1)
EOSINOPHIL # BLD AUTO: 0.09 THOUSAND/ΜL (ref 0–0.61)
EOSINOPHIL NFR BLD AUTO: 1 % (ref 0–6)
ERYTHROCYTE [DISTWIDTH] IN BLOOD BY AUTOMATED COUNT: 21.4 % (ref 11.6–15.1)
FERRITIN SERPL-MCNC: 5 NG/ML (ref 8–388)
HCT VFR BLD AUTO: 39.5 % (ref 34.8–46.1)
HGB BLD-MCNC: 12.2 G/DL (ref 11.5–15.4)
IMM GRANULOCYTES # BLD AUTO: 0.03 THOUSAND/UL (ref 0–0.2)
IMM GRANULOCYTES NFR BLD AUTO: 0 % (ref 0–2)
IRON SATN MFR SERPL: 20 %
IRON SERPL-MCNC: 84 UG/DL (ref 50–170)
LYMPHOCYTES # BLD AUTO: 2.75 THOUSANDS/ΜL (ref 0.6–4.47)
LYMPHOCYTES NFR BLD AUTO: 21 % (ref 14–44)
MCH RBC QN AUTO: 25.7 PG (ref 26.8–34.3)
MCHC RBC AUTO-ENTMCNC: 30.9 G/DL (ref 31.4–37.4)
MCV RBC AUTO: 83 FL (ref 82–98)
MONOCYTES # BLD AUTO: 0.71 THOUSAND/ΜL (ref 0.17–1.22)
MONOCYTES NFR BLD AUTO: 6 % (ref 4–12)
NEUTROPHILS # BLD AUTO: 9.2 THOUSANDS/ΜL (ref 1.85–7.62)
NEUTS SEG NFR BLD AUTO: 71 % (ref 43–75)
NRBC BLD AUTO-RTO: 0 /100 WBCS
PLATELET # BLD AUTO: 246 THOUSANDS/UL (ref 149–390)
PMV BLD AUTO: 10.7 FL (ref 8.9–12.7)
RBC # BLD AUTO: 4.75 MILLION/UL (ref 3.81–5.12)
TIBC SERPL-MCNC: 415 UG/DL (ref 250–450)
WBC # BLD AUTO: 12.85 THOUSAND/UL (ref 4.31–10.16)

## 2020-01-27 PROCEDURE — 87389 HIV-1 AG W/HIV-1&-2 AB AG IA: CPT

## 2020-01-27 PROCEDURE — 83550 IRON BINDING TEST: CPT

## 2020-01-27 PROCEDURE — 90686 IIV4 VACC NO PRSV 0.5 ML IM: CPT | Performed by: INTERNAL MEDICINE

## 2020-01-27 PROCEDURE — 93000 ELECTROCARDIOGRAM COMPLETE: CPT | Performed by: INTERNAL MEDICINE

## 2020-01-27 PROCEDURE — 36415 COLL VENOUS BLD VENIPUNCTURE: CPT

## 2020-01-27 PROCEDURE — 82728 ASSAY OF FERRITIN: CPT

## 2020-01-27 PROCEDURE — 85025 COMPLETE CBC W/AUTO DIFF WBC: CPT

## 2020-01-27 PROCEDURE — 83540 ASSAY OF IRON: CPT

## 2020-01-27 PROCEDURE — 90471 IMMUNIZATION ADMIN: CPT | Performed by: INTERNAL MEDICINE

## 2020-01-27 PROCEDURE — 99214 OFFICE O/P EST MOD 30 MIN: CPT | Performed by: INTERNAL MEDICINE

## 2020-01-27 RX ORDER — LORATADINE 10 MG/1
10 TABLET ORAL DAILY
Qty: 90 TABLET | Refills: 1 | Status: SHIPPED | OUTPATIENT
Start: 2020-01-27 | End: 2020-02-12 | Stop reason: SDUPTHER

## 2020-01-27 RX ORDER — QUETIAPINE FUMARATE 50 MG/1
50 TABLET, FILM COATED ORAL
Qty: 90 TABLET | Refills: 0 | Status: SHIPPED | OUTPATIENT
Start: 2020-01-27

## 2020-01-27 RX ORDER — DULOXETIN HYDROCHLORIDE 30 MG/1
30 CAPSULE, DELAYED RELEASE ORAL EVERY MORNING
Qty: 30 CAPSULE | Refills: 1 | Status: SHIPPED | OUTPATIENT
Start: 2020-01-27

## 2020-01-27 NOTE — PROGRESS NOTES
Tobacco Cessation Counseling: Tobacco cessation counseling was provided  The patient is sincerely urged to quit consumption of tobacco  She is not ready to quit tobacco  Medication options discussed  Side effects of medication not discussed  Patient refused medication     Assessment/Plan:  Problem List Items Addressed This Visit        Other    Tobacco use disorder    Vitamin D deficiency    Fibromyalgia    Relevant Medications    DULoxetine (CYMBALTA) 30 mg delayed release capsule    Other insomnia    Relevant Medications    DULoxetine (CYMBALTA) 30 mg delayed release capsule    QUEtiapine (SEROquel) 50 mg tablet      Other Visit Diagnoses     Irritable bowel syndrome with diarrhea    -  Primary    Relevant Medications    DULoxetine (CYMBALTA) 30 mg delayed release capsule    Allergic state, subsequent encounter        Rx for Singulair, Claritin, Flonase provided    Relevant Medications    loratadine (CLARITIN) 10 mg tablet    Seasonal allergies        Iron deficiency anemia secondary to inadequate dietary iron intake        Relevant Orders    CBC and differential    Iron Panel (Includes Ferritin, Iron Sat%, Iron, and TIBC)    Restless leg        Encounter for vaccination        Relevant Orders    influenza vaccine, [id number], quadrivalent, 0 5 mL, preservative-free, for adult and pediatric patients 6 mos+ (AFLURIA, FLUARIX, FLULAVAL, FLUZONE) (Completed)    Screening for HIV (human immunodeficiency virus)        Relevant Orders    [facility] HIV 1/2 AG-AB combo    Atypical chest pain        Relevant Orders    POCT ECG (Completed)    Palpitation        Relevant Orders    POCT ECG (Completed)    MATTIE (generalized anxiety disorder)        Relevant Medications    DULoxetine (CYMBALTA) 30 mg delayed release capsule    QUEtiapine (SEROquel) 50 mg tablet    Chronic midline thoracic back pain        Relevant Orders    Ambulatory referral to Physical Therapy           Diagnoses and all orders for this visit:    Irritable bowel syndrome with diarrhea  -     DULoxetine (CYMBALTA) 30 mg delayed release capsule; Take 1 capsule (30 mg total) by mouth every morning    Allergic state, subsequent encounter  Comments:  Rx for Singulair, Claritin, Flonase provided  Orders:  -     loratadine (CLARITIN) 10 mg tablet; Take 1 tablet (10 mg total) by mouth daily    Fibromyalgia  -     DULoxetine (CYMBALTA) 30 mg delayed release capsule; Take 1 capsule (30 mg total) by mouth every morning    Tobacco use disorder    Vitamin D deficiency    Seasonal allergies    Other insomnia  -     DULoxetine (CYMBALTA) 30 mg delayed release capsule; Take 1 capsule (30 mg total) by mouth every morning  -     QUEtiapine (SEROquel) 50 mg tablet; Take 1 tablet (50 mg total) by mouth daily at bedtime    Iron deficiency anemia secondary to inadequate dietary iron intake  -     CBC and differential; Future  -     Iron Panel (Includes Ferritin, Iron Sat%, Iron, and TIBC); Future    Restless leg    Encounter for vaccination  -     influenza vaccine, [id number], quadrivalent, 0 5 mL, preservative-free, for adult and pediatric patients 6 mos+ ([address] [facility])    Screening for HIV (human immunodeficiency virus)  -     [facility] HIV 1/2 AG-AB combo; Future    Atypical chest pain  -     POCT ECG    Palpitation  -     POCT ECG    MATTIE (generalized anxiety disorder)  -     DULoxetine (CYMBALTA) 30 mg delayed release capsule; Take 1 capsule (30 mg total) by mouth every morning  -     QUEtiapine (SEROquel) 50 mg tablet; Take 1 tablet (50 mg total) by mouth daily at bedtime    Chronic midline thoracic back pain  -     Ambulatory referral to Physical Therapy; Future        No problem-specific Assessment & Plan notes found for this encounter  A/P: EKG w/o any acute changes  Suspect CP is non cardiac and seems more MATTIE related  Will start SNRI for pain, MATTIE, and sleep  Also, start seroquel for MATTIE and sleep  May need counseling   Unable to NSAID's due to bypass  Will start the SNRI and send to PT  Wean tobacco  Discussed BMI and given information  Recheck labs for EVELINA  Continue current treatment otherwise and RTC six weeks for f/u  Will update her flu shot  Subjective:      Patient ID: Liberty Morales is a 35 y o  female  WF RTC for f/u fibromyalgia, IBS-D, etc  Doing poorly  Reports months of mid T spine pain and seeing a DC w/o relief  No injuries  Past xray is negative  Next, notes nonexertional SS CP and palpitations  No radiation  ?SOB  No sweating or n/v  Last for 30 plus minutes  No relationsship to food  No NSAID use or ETOH  ROS positive or increase MATTIE  Finally, unable to sleep  Trouble falling a sleep and staying a sleep  Taking OTC meds w/o relief  GI s/s are better  Still smoking  The following portions of the patient's history were reviewed and updated as appropriate:   She has a past medical history of Fibromyalgia and Restless leg syndrome  ,  does not have any pertinent problems on file  ,   has a past surgical history that includes Cholecystectomy and Gastric bypass  ,  family history includes Anxiety disorder in her mother; Dementia in her paternal grandmother; Heart disease in her mother; No Known Problems in her father  ,   reports that she has been smoking  She has a 30 00 pack-year smoking history  She has never used smokeless tobacco  She reports that she drank alcohol  She reports that she does not use drugs  ,  has No Known Allergies     Current Outpatient Medications   Medication Sig Dispense Refill    Calcium-Phosphorus-Vitamin D (CITRACAL +D3 PO) Take by mouth      cholestyramine (QUESTRAN) 4 GM/DOSE powder Take 0 5 packets (2 g total) by mouth 3 (three) times a day with meals 378 g 1    ferrous sulfate 324 (65 Fe) mg Take 1 tablet (324 mg total) by mouth 2 (two) times a day before meals 180 tablet 1    Lactobacillus (PROBIOTIC ACIDOPHILUS) TABS Take by mouth      loratadine (CLARITIN) 10 mg tablet Take 1 tablet (10 mg total) by mouth daily 90 tablet 1    montelukast (SINGULAIR) 10 mg tablet Take 1 tablet (10 mg total) by mouth daily at bedtime for 90 days 90 tablet 3    multivitamin (THERAGRAN) TABS Take 1 tablet by mouth daily      DULoxetine (CYMBALTA) 30 mg delayed release capsule Take 1 capsule (30 mg total) by mouth every morning 30 capsule 1    predniSONE 10 mg tablet Take 3 tabs BID X 2 days, 2 tabs BID X 2 days, 1 tab BID X 2 days, 1 tab daily X 2 days (Patient not taking: Reported on 1/27/2020) 26 tablet 0    QUEtiapine (SEROquel) 50 mg tablet Take 1 tablet (50 mg total) by mouth daily at bedtime 90 tablet 0     No current facility-administered medications for this visit  Review of Systems   Constitutional: Negative for activity change, chills, diaphoresis, fatigue and fever  HENT: Negative  Eyes: Negative for visual disturbance  Respiratory: Positive for shortness of breath  Negative for cough, chest tightness and wheezing  Cardiovascular: Positive for chest pain and palpitations  Negative for leg swelling  Gastrointestinal: Negative for abdominal pain, constipation, diarrhea, nausea and vomiting  Endocrine: Negative for cold intolerance and heat intolerance  Genitourinary: Negative for difficulty urinating, dysuria and frequency  Musculoskeletal: Positive for back pain  Negative for arthralgias, gait problem and myalgias  Allergic/Immunologic: Positive for environmental allergies  Neurological: Negative for dizziness, seizures, syncope, weakness, light-headedness, numbness and headaches  Psychiatric/Behavioral: Positive for dysphoric mood and sleep disturbance  Negative for confusion and suicidal ideas  The patient is nervous/anxious          PHQ-9 Depression Screening    PHQ-9:    Frequency of the following problems over the past two weeks:             Objective:  Vitals:    01/27/20 1128   BP: 120/70   Pulse: 76   Resp: 14   Temp: 97 6 °F (36 4 °C)   TempSrc: Tympanic   SpO2: 100%   Weight: 66 2 kg (146 lb)   Height: 5' 6" (1 676 m)     Body mass index is 23 57 kg/m²  Physical Exam   Constitutional: She is oriented to person, place, and time  She appears well-developed and well-nourished  No distress  HENT:   Head: Normocephalic and atraumatic  Mouth/Throat: Oropharynx is clear and moist    Eyes: Pupils are equal, round, and reactive to light  Conjunctivae and EOM are normal    Neck: Neck supple  No JVD present  Cardiovascular: Normal rate, regular rhythm and normal heart sounds  Pulmonary/Chest: Effort normal and breath sounds normal    Abdominal: Soft  Bowel sounds are normal  She exhibits no distension  There is no tenderness  Musculoskeletal: She exhibits no edema  Neurological: She is alert and oriented to person, place, and time  Psychiatric: She has a normal mood and affect  Her behavior is normal  Judgment and thought content normal    Nursing note and vitals reviewed  Tobacco Cessation Counseling: Tobacco cessation counseling and education was provided  The patient is sincerely urged to quit consumption of tobacco  She is not ready to quit tobacco  The numerous health risks of tobacco consumption were discussed  If she decides to quit, there are a number of helpful adjunctive aids, and she can see me to discuss nicotine replacement therapy, chantix, or bupropion anytime in the future

## 2020-01-27 NOTE — PATIENT INSTRUCTIONS
Fibromyalgia   WHAT YOU NEED TO KNOW:   What is fibromyalgia? Fibromyalgia is a long-term condition that causes pain and tender points throughout your body  Fibromyalgia can start at any age and is more common in women than in men  What causes fibromyalgia? Healthcare providers do not know exactly what causes fibromyalgia  Problems with chemicals that send pain messages to and from the brain are thought to cause fibromyalgia  It may also be caused or triggered by any of the following:  · Hormone changes    · Physical injury    · Intense emotional trauma from sexual, physical, or emotional abuse  What are the signs and symptoms of fibromyalgia? The most common symptom of fibromyalgia is widespread pain for at least 3 months  You may also have tender spots  Tender spots are specific areas or points on both sides of your body that are painful when pressed  You may have tender spots in your neck, upper chest, shoulders, or shoulder blades  Other common areas are the elbows, lower back, sides of the thighs, and knees  You may also have any of the following:  · Fatigue and difficulty sleeping    · Diarrhea, constipation, pain, or bloating    · Headaches, memory problems, difficulty concentrating, or anxiety    · Numbness, muscle stiffness, or swelling of the hands and feet    · Pounding, racing heartbeats or chest pain  How is fibromyalgia diagnosed? Your healthcare provider will examine you and ask about your symptoms and other health conditions  He will do a manual tender point exam and press on specific sites or points in your body  Increased pain in most of these spots means a positive tender point exam  There are no specific lab tests to diagnose fibromyalgia  Blood and urine tests, a spinal tap, or sleep studies may be done to rule out other causes of pain  How is fibromyalgia treated? Fibromyalgia can be treated but not cured   The following can help you manage your pain and other symptoms:  · Acetaminophen and ibuprofen: These medicines decrease pain  They are available without a doctor's order  Ask your healthcare provider which medicine is right for you  Ask how much to take and how often to take it  Follow directions  These medicines can cause stomach bleeding if not taken correctly  Ibuprofen can cause kidney damage  Acetaminophen can cause liver damage  · Pain medicine: You may be given a prescription medicine to decrease pain  Do not wait until the pain is severe before you take this medicine  · Muscle relaxers  help decrease pain and muscle spasms  · Antidepressants: These help decrease depression, pain, and fatigue  · Antiseizure medicine: This is used to reduce fibromyalgia pain  What are the risks of fibromyalgia? If untreated, your symptoms may get worse  Pain may make it difficult to do daily activities  Your risk for fatigue, headaches, and depression may increase  How can I manage my symptoms? · Keep a pain diary:  Record your symptoms and what activity caused them  This may also help you track pain cycles and show a pattern to your symptoms  · Exercise:  Ask your healthcare provider about the best exercise plan for you  Exercise and other strength-training activities may decrease pain and sleep problems  · Set good sleep habits:  Do not nap during the day  Go to bed at the same time each night  Make sure your bedroom is dark, quiet, and comfortable  Do not stay in bed if you cannot sleep  Get up and do something relaxing until you are sleepy  Do not drink caffeine or alcohol right before you go to bed  These can make it difficult for you to sleep  Limit other liquids to help decrease your need to urinate in the night  Where can I find support and more information? · National Chronic Fatigue Syndrome and Fibromyalgia Association  [address] [facility] , [address] [place] [address]  Phone: 3- 257 - 978-1471  Web Address: [employer] ee  org  When should I contact my healthcare provider? · You pain increases, even after you take your pain medicine  · You have difficulty sleeping  · You have questions or concerns about your condition or care  When should I seek immediate care or call 911? · You are depressed and feel you cannot cope with your condition  CARE AGREEMENT:   You have the right to help plan your care  Learn about your health condition and how it may be treated  Discuss treatment options with your caregivers to decide what care you want to receive  You always have the right to refuse treatment  The above information is an  only  It is not intended as medical advice for individual conditions or treatments  Talk to your doctor, nurse or pharmacist before following any medical regimen to see if it is safe and effective for you  © 2017 [address]  Information is for End User's use only and may not be sold, redistributed or otherwise used for commercial purposes  All illustrations and images included in CareNotes® are the copyrighted property of [employer]  or [employer]ch [employer]  Cigarette Smoking and Your Health   AMBULATORY CARE:   Risks to your health if you smoke:  Nicotine and other chemicals found in tobacco damage every cell in your body  Even if you are a light smoker, you have an increased risk for cancer, heart disease, and lung disease  If you are pregnant or have diabetes, smoking increases your risk for complications  Benefits to your health if you stop smoking:   · You decrease respiratory symptoms such as coughing, wheezing, and shortness of breath  · You reduce your risk for cancers of the lung, mouth, throat, kidney, bladder, pancreas, stomach, and cervix  If you already have cancer, you increase the benefits of chemotherapy  You also reduce your risk for cancer returning or a second cancer from developing  · You reduce your risk for heart disease, blood clots, heart attack, and stroke       · You reduce your risk for lung infections, and diseases such as pneumonia, asthma, chronic bronchitis, and emphysema  · Your circulation improves  More oxygen can be delivered to your body  If you have diabetes, you lower your risk for complications, such as kidney, artery, and eye diseases  You also lower your risk for nerve damage  Nerve damage can lead to amputations, poor vision, and blindness  · You improve your body's ability to heal and to fight infections  Benefits to the health of others if you stop smoking:  Tobacco is harmful to nonsmokers who breathe in your secondhand smoke  The following are ways the health of others around you may improve when you stop smoking:  · You lower the risks for lung cancer and heart disease in nonsmoking adults  · If you are pregnant, you lower the risk for miscarriage, early delivery, low birth weight, and stillbirth  You also lower your baby's risk for SIDS, obesity, developmental delay, and neurobehavioral problems, such as ADHD  · If you have children, you lower their risk for ear infections, colds, pneumonia, bronchitis, and asthma  For more information and support to stop smoking:   · [employer]  Phone: 4- 242 - 468-7653  Web Address: www [employer]  Follow up with your healthcare provider as directed:  Write down your questions so you remember to ask them during your visits  © 2017 [facility] Information is for End User's use only and may not be sold, redistributed or otherwise used for commercial purposes  All illustrations and images included in CareNotes® are the copyrighted property of [employer]  or Butch Kang  The above information is an  only  It is not intended as medical advice for individual conditions or treatments  Talk to your doctor, nurse or pharmacist before following any medical regimen to see if it is safe and effective for you

## 2020-01-28 LAB — HIV 1+2 AB+HIV1 P24 AG SERPL QL IA: NORMAL

## 2020-01-31 ENCOUNTER — TELEPHONE (OUTPATIENT)
Dept: SURGICAL ONCOLOGY | Facility: CLINIC | Age: 40
End: 2020-01-31

## 2020-01-31 DIAGNOSIS — R79.9 ABNORMAL BLOOD CHEMISTRY LEVEL: Primary | ICD-10-CM

## 2020-01-31 NOTE — TELEPHONE ENCOUNTER
New Patient Encounter    New Patient Intake Form   Patient Details:  Hao Benedict  1980  [id number]    Background Information:  [id number] starts by opening a telephone encounter and gathering the following information   Who is calling to schedule? If not self, relationship to patient? self   Referring Provider Dr Swapna Verde   What is the diagnosis? Elevated wbc/anemia   Is this diagnosis confirmed Yes   Is there a confirmed diagnosis from a biopsy/tissue reviewed by pathology? Is there any prior history of Cancer? NA   If yes, please explain    When was the diagnosis? 1/2020   Is patient aware of diagnosis? Yes   Reason for visit? NP DX   Have you had any testing done? If so: when, where? Yes   Are records in [facility]? yes   Was the patient told to bring a disk? no   Scheduling Information:   Preferred [place]:  [facility]     Requesting Specific Provider? habib   Are there any dates/time the patient cannot be seen? no      Miscellaneous: na   After completing the above information, please route to Financial Counselor and the appropriate Nurse Navigator for review

## 2020-02-11 ENCOUNTER — OFFICE VISIT (OUTPATIENT)
Dept: FAMILY MEDICINE CLINIC | Facility: CLINIC | Age: 40
End: 2020-02-11
Payer: OTHER GOVERNMENT

## 2020-02-11 ENCOUNTER — TELEPHONE (OUTPATIENT)
Dept: HEMATOLOGY ONCOLOGY | Facility: CLINIC | Age: 40
End: 2020-02-11

## 2020-02-11 VITALS
TEMPERATURE: 97.5 F | BODY MASS INDEX: 23.63 KG/M2 | WEIGHT: 147 LBS | DIASTOLIC BLOOD PRESSURE: 90 MMHG | SYSTOLIC BLOOD PRESSURE: 128 MMHG | OXYGEN SATURATION: 100 % | HEIGHT: 66 IN | HEART RATE: 98 BPM

## 2020-02-11 DIAGNOSIS — R74.8 ELEVATED ALKALINE PHOSPHATASE LEVEL: ICD-10-CM

## 2020-02-11 DIAGNOSIS — Z90.49 HISTORY OF CHOLECYSTECTOMY: ICD-10-CM

## 2020-02-11 DIAGNOSIS — R10.9 ABDOMINAL PAIN, UNSPECIFIED ABDOMINAL LOCATION: Primary | ICD-10-CM

## 2020-02-11 DIAGNOSIS — Z98.84 H/O GASTRIC BYPASS: ICD-10-CM

## 2020-02-11 DIAGNOSIS — R10.2 PELVIC PAIN: ICD-10-CM

## 2020-02-11 DIAGNOSIS — K64.9 HEMORRHOIDS, UNSPECIFIED HEMORRHOID TYPE: ICD-10-CM

## 2020-02-11 DIAGNOSIS — F17.200 TOBACCO USE DISORDER: ICD-10-CM

## 2020-02-11 PROCEDURE — 4004F PT TOBACCO SCREEN RCVD TLK: CPT | Performed by: NURSE PRACTITIONER

## 2020-02-11 PROCEDURE — 99214 OFFICE O/P EST MOD 30 MIN: CPT | Performed by: NURSE PRACTITIONER

## 2020-02-11 PROCEDURE — 3008F BODY MASS INDEX DOCD: CPT | Performed by: NURSE PRACTITIONER

## 2020-02-11 NOTE — PROGRESS NOTES
Assessment/Plan:    No problem-specific Assessment & Plan notes found for this encounter  Diagnoses and all orders for this visit:    Abdominal pain, unspecified abdominal location  Comments:  CT abd pelvis ordered w contrast  Orders:  -     Cancel: CT abdomen pelvis wo contrast; Future  -     CT abdomen pelvis w contrast; Future    Elevated alkaline phosphatase level    Hemorrhoids, unspecified hemorrhoid type  Comments:  referred to Dr Irma Canales  Orders:  -     Ambulatory referral to General Surgery; Future    Pelvic pain  Comments:  CT abd pelvis ordered w contrast  Orders:  -     Cancel: CT abdomen pelvis wo contrast; Future  -     CT abdomen pelvis w contrast; Future    H/O gastric bypass  -     CT abdomen pelvis w contrast; Future    Tobacco use disorder    History of cholecystectomy          Subjective:      Patient ID: Airam Blunt is a 35 y o  female  Subjective  Airam Blunt is a 35 y o  female who presents for evaluation of abdominal pain  Onset was months ago  Symptoms have been waxing and waning    The pain is described as aching and stabbing, and is 6/10 in intensity  Pain is located in the periumbilical region with radiation to back  Aggravating factors: empty stomach  Pt also reports heavy clotting menses monthly in addition to ongoing anxiety in the setting of prior gastric bypass surgery  Pt is concerned she may have a stomach ulcer however with her prior bypass surgery and pelvic pain, I am concerned about adhesions vs gastroparesis  Pt also reports episodic diarrhea and constipation     Alleviating factors: are temporary with cholestyramine   Associated symptoms: belching, constipation and diarrhea   The patient denies melena but does have known iron deficiency s/p bypass surgery and is f/u with hematology regarding potential iron infusions, oral admin Fe not bringing up her Fe count        The following portions of the patient's history were reviewed and updated as appropriate: She has a past medical history of Allergic (2019), Anemia (1999), Anxiety (Years), Arthritis (2007), Diabetes mellitus ([facility] [address] ) (2005,2013,2016), Fibromyalgia, GERD (gastroesophageal reflux disease) (2016), Obesity (2004), Restless leg syndrome, and Shingles (2002)  She Patient Active Problem List    Fibromyalgia         Date Noted: 06/12/2019      H/O gastric bypass         Date Noted: 06/12/2019      Other insomnia         Date Noted: 06/12/2019      Restless leg syndrome         Date Noted: 06/12/2019      History of cholecystectomy         Date Noted: 06/12/2019      Elevated alkaline phosphatase level         Date Noted: 11/18/2016      Vitamin D deficiency         Date Noted: 11/18/2016      History of gestational diabetes mellitus, not pregnant         Date Noted: 06/26/2013      Tobacco use disorder         Date Noted: 04/17/2013      She  has a past surgical history that includes Cholecystectomy and Gastric bypass  Her family history includes ADD / ADHD in her father; Anxiety disorder in her mother; Bipolar disorder in her father; COPD in her maternal uncle; Cancer in her maternal grandmother; Dementia in her paternal grandmother; Early death in her maternal grandfather; Heart disease in her mother; Stroke in her maternal grandfather; Suicide Attempts in her father  She  reports that she has been smoking cigarettes  She has a 25 00 pack-year smoking history  She has never used smokeless tobacco  She reports that she drank alcohol  She reports that she does not use drugs    Current Outpatient Medications:  Calcium-Phosphorus-Vitamin D (CITRACAL +D3 PO), Take by mouth, Disp: , Rfl:   DULoxetine (CYMBALTA) 30 mg delayed release capsule, Take 1 capsule (30 mg total) by mouth every morning, Disp: 30 capsule, Rfl: 1  ferrous sulfate 324 (65 Fe) mg, Take 1 tablet (324 mg total) by mouth 2 (two) times a day before meals, Disp: 180 tablet, Rfl: 1  Lactobacillus (PROBIOTIC ACIDOPHILUS) TABS, Take by mouth, Disp: , Rfl: loratadine (CLARITIN) 10 mg tablet, Take 1 tablet (10 mg total) by mouth daily, Disp: 90 tablet, Rfl: 1  multivitamin (THERAGRAN) TABS, Take 1 tablet by mouth daily, Disp: , Rfl:   QUEtiapine (SEROquel) 50 mg tablet, Take 1 tablet (50 mg total) by mouth daily at bedtime, Disp: 90 tablet, Rfl: 0  cholestyramine (QUESTRAN) 4 GM/DOSE powder, Take 0 5 packets (2 g total) by mouth 3 (three) times a day with meals, Disp: 378 g, Rfl: 1  montelukast (SINGULAIR) 10 mg tablet, Take 1 tablet (10 mg total) by mouth daily at bedtime for 90 days, Disp: 90 tablet, Rfl: 3  predniSONE 10 mg tablet, Take 3 tabs BID X 2 days, 2 tabs BID X 2 days, 1 tab BID X 2 days, 1 tab daily X 2 days (Patient not taking: Reported on 1/27/2020), Disp: 26 tablet, Rfl: 0    No current facility-administered medications for this visit  Current Outpatient Medications on File Prior to Visit:  Calcium-Phosphorus-Vitamin D (CITRACAL +D3 PO), Take by mouth  DULoxetine (CYMBALTA) 30 mg delayed release capsule, Take 1 capsule (30 mg total) by mouth every morning  ferrous sulfate 324 (65 Fe) mg, Take 1 tablet (324 mg total) by mouth 2 (two) times a day before meals  Lactobacillus (PROBIOTIC ACIDOPHILUS) TABS, Take by mouth  loratadine (CLARITIN) 10 mg tablet, Take 1 tablet (10 mg total) by mouth daily  multivitamin (THERAGRAN) TABS, Take 1 tablet by mouth daily  QUEtiapine (SEROquel) 50 mg tablet, Take 1 tablet (50 mg total) by mouth daily at bedtime  cholestyramine (QUESTRAN) 4 GM/DOSE powder, Take 0 5 packets (2 g total) by mouth 3 (three) times a day with meals  montelukast (SINGULAIR) 10 mg tablet, Take 1 tablet (10 mg total) by mouth daily at bedtime for 90 days  predniSONE 10 mg tablet, Take 3 tabs BID X 2 days, 2 tabs BID X 2 days, 1 tab BID X 2 days, 1 tab daily X 2 days (Patient not taking: Reported on 1/27/2020)    No current facility-administered medications on file prior to visit  She has No Known Allergies        Review of Systems  Constitutional: positive for fatigue and night sweats  Respiratory: negative  Cardiovascular: negative  Gastrointestinal: positive for abdominal pain and change in bowel habits  Behavioral/Psych: positive for anxiety     Objective  /90   Pulse 98   Temp 97 5 °F (36 4 °C)   Ht 5' 6" (1 676 m)   Wt 66 7 kg (147 lb)   LMP 01/13/2020 (Approximate)   SpO2 100%   BMI 23 73 kg/m²   General appearance: alert and oriented, in no acute distress  Lungs: clear to auscultation bilaterally  Heart: regular rate and rhythm, S1, S2 normal, no murmur, click, rub or gallop  Abdomen: abnormal findings:  Palpable pain above the umbilicus as well as pelvis  Pelvic: deferred    Assessment/Plan  Abdominal pain, likely secondary to prior gastric bypass surgery vs peptic ulcer vs gastroparesis  See orders for lab and imaging studies  Further follow-up plans will be based on outcome of lab/imaging studies; see orders  Referral to General Surgery  Follow up with PCP in 3 weeks  The following portions of the patient's history were reviewed and updated as appropriate: She  has a past medical history of Allergic (2019), Anemia (1999), Anxiety (Years), Arthritis (2007), Diabetes mellitus ([facility] [address] ) (2005,2013,2016), Fibromyalgia, GERD (gastroesophageal reflux disease) (2016), Obesity (2004), Restless leg syndrome, and Shingles (2002)  She   Patient Active Problem List    Diagnosis Date Noted    Fibromyalgia 06/12/2019    H/O gastric bypass 06/12/2019    Other insomnia 06/12/2019    Restless leg syndrome 06/12/2019    History of cholecystectomy 06/12/2019    Elevated alkaline phosphatase level 11/18/2016    Vitamin D deficiency 11/18/2016    History of gestational diabetes mellitus, not pregnant 06/26/2013    Tobacco use disorder 04/17/2013     She  has a past surgical history that includes Cholecystectomy and Gastric bypass  Her family history includes ADD / ADHD in her father;  Anxiety disorder in her mother; Bipolar disorder in her father; COPD in her maternal uncle; Cancer in her maternal grandmother; Dementia in her paternal grandmother; Early death in her maternal grandfather; Heart disease in her mother; Stroke in her maternal grandfather; Suicide Attempts in her father  She  reports that she has been smoking cigarettes  She has a 25 00 pack-year smoking history  She has never used smokeless tobacco  She reports that she drank alcohol  She reports that she does not use drugs  Current Outpatient Medications   Medication Sig Dispense Refill    Calcium-Phosphorus-Vitamin D (CITRACAL +D3 PO) Take by mouth      DULoxetine (CYMBALTA) 30 mg delayed release capsule Take 1 capsule (30 mg total) by mouth every morning 30 capsule 1    ferrous sulfate 324 (65 Fe) mg Take 1 tablet (324 mg total) by mouth 2 (two) times a day before meals 180 tablet 1    Lactobacillus (PROBIOTIC ACIDOPHILUS) TABS Take by mouth      loratadine (CLARITIN) 10 mg tablet Take 1 tablet (10 mg total) by mouth daily 90 tablet 1    multivitamin (THERAGRAN) TABS Take 1 tablet by mouth daily      QUEtiapine (SEROquel) 50 mg tablet Take 1 tablet (50 mg total) by mouth daily at bedtime 90 tablet 0    cholestyramine (QUESTRAN) 4 GM/DOSE powder Take 0 5 packets (2 g total) by mouth 3 (three) times a day with meals 378 g 1    montelukast (SINGULAIR) 10 mg tablet Take 1 tablet (10 mg total) by mouth daily at bedtime for 90 days 90 tablet 3    predniSONE 10 mg tablet Take 3 tabs BID X 2 days, 2 tabs BID X 2 days, 1 tab BID X 2 days, 1 tab daily X 2 days (Patient not taking: Reported on 1/27/2020) 26 tablet 0     No current facility-administered medications for this visit        Current Outpatient Medications on File Prior to Visit   Medication Sig    Calcium-Phosphorus-Vitamin D (CITRACAL +D3 PO) Take by mouth    DULoxetine (CYMBALTA) 30 mg delayed release capsule Take 1 capsule (30 mg total) by mouth every morning    ferrous sulfate 324 (65 Fe) mg Take 1 tablet (324 mg total) by mouth 2 (two) times a day before meals    Lactobacillus (PROBIOTIC ACIDOPHILUS) TABS Take by mouth    loratadine (CLARITIN) 10 mg tablet Take 1 tablet (10 mg total) by mouth daily    multivitamin (THERAGRAN) TABS Take 1 tablet by mouth daily    QUEtiapine (SEROquel) 50 mg tablet Take 1 tablet (50 mg total) by mouth daily at bedtime    cholestyramine (QUESTRAN) 4 GM/DOSE powder Take 0 5 packets (2 g total) by mouth 3 (three) times a day with meals    montelukast (SINGULAIR) 10 mg tablet Take 1 tablet (10 mg total) by mouth daily at bedtime for 90 days    predniSONE 10 mg tablet Take 3 tabs BID X 2 days, 2 tabs BID X 2 days, 1 tab BID X 2 days, 1 tab daily X 2 days (Patient not taking: Reported on 1/27/2020)     No current facility-administered medications on file prior to visit  She has No Known Allergies       Review of Systems   Constitutional: Positive for appetite change  Gastrointestinal: Positive for abdominal pain, constipation, diarrhea, nausea and vomiting  Objective:      /90   Pulse 98   Temp 97 5 °F (36 4 °C)   Ht 5' 6" (1 676 m)   Wt 66 7 kg (147 lb)   LMP 01/13/2020 (Approximate)   SpO2 100%   BMI 23 73 kg/m²          Physical Exam   Constitutional: Vital signs are normal  She has a sickly appearance  Abdominal: There is tenderness in the right upper quadrant and epigastric area  There is CVA tenderness  A hernia is present

## 2020-02-12 DIAGNOSIS — T78.40XD ALLERGIC STATE, SUBSEQUENT ENCOUNTER: ICD-10-CM

## 2020-02-13 ENCOUNTER — TELEPHONE (OUTPATIENT)
Dept: SURGERY | Facility: CLINIC | Age: 40
End: 2020-02-13

## 2020-02-13 RX ORDER — LORATADINE 10 MG/1
10 TABLET ORAL DAILY
Qty: 90 TABLET | Refills: 0 | Status: SHIPPED | OUTPATIENT
Start: 2020-02-13 | End: 2021-03-09

## 2020-02-13 NOTE — TELEPHONE ENCOUNTER
Left a message for patient to call our office to schedule an appointment for hemorrhoids per a referral from Murray Nunez

## 2020-02-17 ENCOUNTER — HOSPITAL ENCOUNTER (OUTPATIENT)
Dept: CT IMAGING | Facility: HOSPITAL | Age: 40
Discharge: HOME | End: 2020-02-17
Payer: OTHER GOVERNMENT

## 2020-02-17 DIAGNOSIS — R10.2 PELVIC PAIN: ICD-10-CM

## 2020-02-17 DIAGNOSIS — Z98.84 H/O GASTRIC BYPASS: ICD-10-CM

## 2020-02-17 DIAGNOSIS — R10.9 ABDOMINAL PAIN, UNSPECIFIED ABDOMINAL LOCATION: ICD-10-CM

## 2020-02-17 PROCEDURE — 74176 CT ABD & PELVIS W/O CONTRAST: CPT

## 2020-02-21 ENCOUNTER — APPOINTMENT (EMERGENCY)
Dept: CT IMAGING | Facility: HOSPITAL | Age: 40
End: 2020-02-21
Payer: OTHER GOVERNMENT

## 2020-02-21 ENCOUNTER — TELEPHONE (OUTPATIENT)
Dept: FAMILY MEDICINE CLINIC | Facility: CLINIC | Age: 40
End: 2020-02-21

## 2020-02-21 ENCOUNTER — HOSPITAL ENCOUNTER (EMERGENCY)
Facility: HOSPITAL | Age: 40
Discharge: HOME | End: 2020-02-21
Attending: EMERGENCY MEDICINE | Admitting: EMERGENCY MEDICINE
Payer: OTHER GOVERNMENT

## 2020-02-21 VITALS
OXYGEN SATURATION: 100 % | SYSTOLIC BLOOD PRESSURE: 123 MMHG | HEART RATE: 85 BPM | WEIGHT: 147.93 LBS | BODY MASS INDEX: 23.88 KG/M2 | TEMPERATURE: 99 F | DIASTOLIC BLOOD PRESSURE: 75 MMHG | RESPIRATION RATE: 19 BRPM

## 2020-02-21 DIAGNOSIS — R10.9 ABDOMINAL PAIN: Primary | ICD-10-CM

## 2020-02-21 LAB
ANION GAP SERPL CALCULATED.3IONS-SCNC: 9 MMOL/L (ref 4–13)
BUN SERPL-MCNC: 10 MG/DL (ref 5–25)
CALCIUM SERPL-MCNC: 9.2 MG/DL (ref 8.3–10.1)
CHLORIDE SERPL-SCNC: 103 MMOL/L (ref 100–108)
CO2 SERPL-SCNC: 27 MMOL/L (ref 21–32)
CREAT SERPL-MCNC: 0.73 MG/DL (ref 0.6–1.3)
EXT PREG TEST URINE: NORMAL
EXT. CONTROL ED NAV: NORMAL
GFR SERPL CREATININE-BSD FRML MDRD: 103 ML/MIN/1.73SQ M
GLUCOSE SERPL-MCNC: 113 MG/DL (ref 65–140)
POTASSIUM SERPL-SCNC: 4 MMOL/L (ref 3.5–5.3)
SODIUM SERPL-SCNC: 139 MMOL/L (ref 136–145)

## 2020-02-21 PROCEDURE — 99283 EMERGENCY DEPT VISIT LOW MDM: CPT | Performed by: EMERGENCY MEDICINE

## 2020-02-21 PROCEDURE — 81025 URINE PREGNANCY TEST: CPT | Performed by: EMERGENCY MEDICINE

## 2020-02-21 PROCEDURE — 99284 EMERGENCY DEPT VISIT MOD MDM: CPT

## 2020-02-21 PROCEDURE — 36415 COLL VENOUS BLD VENIPUNCTURE: CPT | Performed by: EMERGENCY MEDICINE

## 2020-02-21 PROCEDURE — 80048 BASIC METABOLIC PNL TOTAL CA: CPT | Performed by: EMERGENCY MEDICINE

## 2020-02-21 PROCEDURE — 74177 CT ABD & PELVIS W/CONTRAST: CPT

## 2020-02-21 RX ADMIN — IOHEXOL 100 ML: 350 INJECTION, SOLUTION INTRAVENOUS at 17:14

## 2020-02-21 RX ADMIN — IOHEXOL 10 ML: 240 INJECTION, SOLUTION INTRATHECAL; INTRAVASCULAR; INTRAVENOUS; ORAL at 17:14

## 2020-02-21 NOTE — TELEPHONE ENCOUNTER
Called patient to notify her of results and to go to er per Jaime Anderson to [address] [facility] patient stated she will call her  to take her

## 2020-02-21 NOTE — TELEPHONE ENCOUNTER
Radiology called with stat results from CT abdomin and pelvis  Results were called to [facility] Butsillo asked us to call the pt and have her go directly to the ER in [facility]

## 2020-02-21 NOTE — ED NOTES
Pt ambulatory to the bathroom with steady gait at this time        Branden Mccormack RN  02/21/20 9902

## 2020-02-21 NOTE — ED PROVIDER NOTES
History  Chief Complaint   Patient presents with    Abdominal Pain     Pt reports she was seen by PCP for abd pain and pain after eating  Reports that she had CT done and was called with results and told to go to ED because she has intususseption  Pt with Hx gastric bypass  Denies vomiting , no blood present in stools     36 YO female presents for ongoing issues with abdominal discomfort  Pt states she has a Hx of gastric bypass years prior, she has been having a central abdominal pain after eating, states this is sharp, intermittent  Pt currently denies this discomfort  She has no nausea or vomiting with this  Pt was seeing her PCP for this and out outpatient labs and a CT performed  Pt states the CT did show a possible intussusception and she was instructed to come to the ED for evaluation  Pt denies CP/SOB/F/C/N/V/D/C, no dysuria, burning on urination or blood in urine  History provided by:  Patient   used: No    Abdominal Pain   Pain location:  Periumbilical  Pain quality: aching and sharp    Pain radiates to:  Does not radiate  Pain severity:  Moderate  Onset quality:  Gradual  Timing:  Intermittent  Progression:  Waxing and waning  Chronicity:  New  Relieved by:  Nothing  Worsened by:  Eating  Associated symptoms: no chest pain, no chills, no cough, no diarrhea, no dysuria, no fatigue, no fever, no nausea, no shortness of breath and no vomiting        Prior to Admission Medications   Prescriptions Last Dose Informant Patient Reported? Taking?    Calcium-Phosphorus-Vitamin D (CITRACAL +D3 PO)   Yes Yes   Sig: Take by mouth   DULoxetine (CYMBALTA) 30 mg delayed release capsule Not Taking at Unknown time  No No   Sig: Take 1 capsule (30 mg total) by mouth every morning   Patient not taking: Reported on 2/21/2020   Lactobacillus (PROBIOTIC ACIDOPHILUS) TABS   Yes Yes   Sig: Take by mouth   QUEtiapine (SEROquel) 50 mg tablet Not Taking at Unknown time  No No   Sig: Take 1 tablet (50 mg total) by mouth daily at bedtime   Patient not taking: Reported on 2/21/2020   ferrous sulfate 324 (65 Fe) mg   No Yes   Sig: Take 1 tablet (324 mg total) by mouth 2 (two) times a day before meals   loratadine (CLARITIN) 10 mg tablet   No Yes   Sig: Take 1 tablet (10 mg total) by mouth daily   multivitamin (THERAGRAN) TABS   Yes Yes   Sig: Take 1 tablet by mouth daily      Facility-Administered Medications: None       Past Medical History:   Diagnosis Date    Allergic 2019    Anemia 1999    During second pregnancy    Anxiety Years    I notice I get anxious over a lot of things    Arthritis 2007    Diabetes mellitus ([facility] [address] ) 2005,2013,2016    Two pregnancies and then in 2016    Fibromyalgia     GERD (gastroesophageal reflux disease) 2016    Obesity 2004    Restless leg syndrome     Shingles 2002       Past Surgical History:   Procedure Laterality Date    CHOLECYSTECTOMY      GASTRIC BYPASS         Family History   Problem Relation Age of Onset    Heart disease Mother     Anxiety disorder Mother     ADD / ADHD Father     Bipolar disorder Father     Suicide Attempts Father     Dementia Paternal Grandmother     Cancer Maternal Grandmother         Lung cancer    COPD Maternal Uncle     Early death Maternal Grandfather         Heart attack 38yrs old    Stroke Maternal Grandfather      I have reviewed and agree with the history as documented  Social History     Tobacco Use    Smoking status: Current Every Day Smoker     Packs/day: 1 00     Years: 25 00     Pack years: 25 00     Types: Cigarettes    Smokeless tobacco: Never Used   Substance Use Topics    Alcohol use: Not Currently     Frequency: Never    Drug use: Never       Review of Systems   Constitutional: Negative for chills, fatigue and fever  HENT: Negative for dental problem  Eyes: Negative for visual disturbance  Respiratory: Negative for cough and shortness of breath  Cardiovascular: Negative for chest pain     Gastrointestinal: Positive for abdominal pain  Negative for diarrhea, nausea and vomiting  Genitourinary: Negative for dysuria and frequency  Musculoskeletal: Negative for arthralgias  Skin: Negative for rash  Neurological: Negative for dizziness, weakness and light-headedness  Psychiatric/Behavioral: Negative for agitation, behavioral problems and confusion  All other systems reviewed and are negative  Physical Exam  Physical Exam   Constitutional: She is oriented to person, place, and time  She appears well-developed and well-nourished  HENT:   Head: Normocephalic and atraumatic  Eyes: Pupils are equal, round, and reactive to light  EOM are normal    Neck: Normal range of motion  Cardiovascular: Normal rate, regular rhythm and normal heart sounds  Pulmonary/Chest: Effort normal and breath sounds normal    Abdominal: Soft  There is no tenderness  Musculoskeletal: Normal range of motion  Neurological: She is alert and oriented to person, place, and time  Skin: Skin is warm and dry  Psychiatric: She has a normal mood and affect  Her behavior is normal  Thought content normal    Nursing note and vitals reviewed        Vital Signs  ED Triage Vitals [02/21/20 1522]   Temperature Pulse Respirations Blood Pressure SpO2   99 °F (37 2 °C) 104 18 146/73 100 %      Temp Source Heart Rate Source Patient Position - Orthostatic VS BP Location FiO2 (%)   Temporal Monitor Sitting Right arm --      Pain Score       2           Vitals:    02/21/20 1522 02/21/20 1801   BP: 146/73 123/75   Pulse: 104 85   Patient Position - Orthostatic VS: Sitting Lying         Visual Acuity      ED Medications  Medications   iohexol (OMNIPAQUE) 350 MG/ML injection (MULTI-DOSE) 100 mL (100 mL Intravenous Given 2/21/20 1714)   iohexol (OMNIPAQUE) 240 MG/ML solution 10 mL (10 mL Oral Given 2/21/20 1714)       Diagnostic Studies  Results Reviewed     Procedure Component Value Units Date/Time    POCT pregnancy, urine [[id number]]  (Normal) Resulted:  02/21/20 1655    Lab Status:  Final result Updated:  02/21/20 1655     EXT PREG TEST UR (Ref: Negative) Negative (-)     Control Valid    Basic metabolic panel [[id number]] Collected:  02/21/20 1613    Lab Status:  Final result Specimen:  Blood from Arm, Right Updated:  02/21/20 1628     Sodium 139 mmol/L      Potassium 4 0 mmol/L      Chloride 103 mmol/L      CO2 27 mmol/L      ANION GAP 9 mmol/L      BUN 10 mg/dL      Creatinine 0 73 mg/dL      Glucose 113 mg/dL      Calcium 9 2 mg/dL      eGFR 103 ml/min/1 73sq m     Narrative:       [place] guidelines for Chronic Kidney Disease (CKD):     Stage 1 with normal or high GFR (GFR > 90 mL/min/1 73 square meters)    Stage 2 Mild CKD (GFR = 60-89 mL/min/1 73 square meters)    Stage 3A Moderate CKD (GFR = 45-59 mL/min/1 73 square meters)    Stage 3B Moderate CKD (GFR = 30-44 mL/min/1 73 square meters)    Stage 4 Severe CKD (GFR = 15-29 mL/min/1 73 square meters)    Stage 5 End Stage CKD (GFR <15 mL/min/1 73 square meters)  Note: GFR calculation is accurate only with a steady state creatinine                 CT abdomen pelvis with contrast   Final Result by Antonella Juarez MD (02/21 1746)      Status post gastric bypass  No obstruction  Workstation performed: [id number]                    Procedures  Procedures         ED Course                               MDM  Number of Diagnoses or Management Options  Abdominal pain: new and requires workup  Diagnosis management comments: 1  Abdominal pain - Pt states outpatient CT showed a possible intussusception, she has no pain right now  Will recheck CT to rule this out  Pt will likely require follow up with bariatric surgeon         Amount and/or Complexity of Data Reviewed  Clinical lab tests: ordered and reviewed  Tests in the radiology section of CPT®: ordered and reviewed  Obtain history from someone other than the patient: yes  Review and summarize past medical records: yes  Independent visualization of images, tracings, or specimens: yes    Patient Progress  Patient progress: improved        Disposition  Final diagnoses:   Abdominal pain     Time reflects when diagnosis was documented in both MDM as applicable and the Disposition within this note     Time User Action Codes Description Comment    2/21/2020  6:02 PM Doyne Hodgkins E Add [R10 9] Abdominal pain       ED Disposition     ED Disposition Condition Date/Time Comment    Discharge Stable Fri Feb 21, 2020  6:02 PM Bianca Cacereslee discharge to home/self care  Follow-up Information     Follow up With Specialties Details Why Contact Info    Mandeep Sherman MD General Surgery   [address] [place] [address]  [phone]            Discharge Medication List as of 2/21/2020  6:03 PM      CONTINUE these medications which have NOT CHANGED    Details   Calcium-Phosphorus-Vitamin D (CITRACAL +D3 PO) Take by mouth, Historical Med      DULoxetine (CYMBALTA) 30 mg delayed release capsule Take 1 capsule (30 mg total) by mouth every morning, Starting Mon 1/27/2020, Normal      ferrous sulfate 324 (65 Fe) mg Take 1 tablet (324 mg total) by mouth 2 (two) times a day before meals, Starting Fri 10/25/2019, Normal      Lactobacillus (PROBIOTIC ACIDOPHILUS) TABS Take by mouth, Historical Med      loratadine (CLARITIN) 10 mg tablet Take 1 tablet (10 mg total) by mouth daily, Starting Thu 2/13/2020, Until Sat 3/14/2020, Normal      multivitamin (THERAGRAN) TABS Take 1 tablet by mouth daily, Historical Med      QUEtiapine (SEROquel) 50 mg tablet Take 1 tablet (50 mg total) by mouth daily at bedtime, Starting Mon 1/27/2020, Normal           No discharge procedures on file      PDMP Review     None          ED Provider  Electronically Signed by           Adriana Munoz MD  02/21/20 0557

## 2020-02-21 NOTE — TELEPHONE ENCOUNTER
Spoke to patient and informed her Sammy Dax wants her to go to ER in [place]  ( [facility])

## 2020-02-21 NOTE — DISCHARGE INSTRUCTIONS
Call your surgeon on Monday, let them know you have been having discomfort, you should be seen in the office for further evaluation and management

## 2020-02-27 ENCOUNTER — CONSULT (OUTPATIENT)
Dept: HEMATOLOGY ONCOLOGY | Facility: CLINIC | Age: 40
End: 2020-02-27
Payer: OTHER GOVERNMENT

## 2020-02-27 VITALS
RESPIRATION RATE: 16 BRPM | WEIGHT: 148.4 LBS | TEMPERATURE: 96.1 F | SYSTOLIC BLOOD PRESSURE: 118 MMHG | OXYGEN SATURATION: 98 % | DIASTOLIC BLOOD PRESSURE: 82 MMHG | HEART RATE: 106 BPM | HEIGHT: 67 IN | BODY MASS INDEX: 23.29 KG/M2

## 2020-02-27 DIAGNOSIS — R79.9 ABNORMAL BLOOD CHEMISTRY LEVEL: ICD-10-CM

## 2020-02-27 DIAGNOSIS — G47.09 OTHER INSOMNIA: ICD-10-CM

## 2020-02-27 DIAGNOSIS — Z98.84 H/O GASTRIC BYPASS: ICD-10-CM

## 2020-02-27 DIAGNOSIS — D72.828 OTHER ELEVATED WHITE BLOOD CELL (WBC) COUNT: ICD-10-CM

## 2020-02-27 DIAGNOSIS — D50.8 IRON DEFICIENCY ANEMIA SECONDARY TO INADEQUATE DIETARY IRON INTAKE: Primary | ICD-10-CM

## 2020-02-27 DIAGNOSIS — E53.8 B12 DEFICIENCY: ICD-10-CM

## 2020-02-27 PROCEDURE — 99245 OFF/OP CONSLTJ NEW/EST HI 55: CPT | Performed by: NURSE PRACTITIONER

## 2020-02-27 RX ORDER — SODIUM CHLORIDE 9 MG/ML
20 INJECTION, SOLUTION INTRAVENOUS ONCE
Status: CANCELLED | OUTPATIENT
Start: 2020-03-05

## 2020-02-27 RX ORDER — CYANOCOBALAMIN 1000 UG/ML
1000 INJECTION INTRAMUSCULAR; SUBCUTANEOUS ONCE
Status: CANCELLED | OUTPATIENT
Start: 2020-03-05

## 2020-02-27 NOTE — PROGRESS NOTES
Hematology/Oncology Outpatient Consultation  Mal Herron 35 y o  female 1980 [id number]    Date:  2/27/2020      Assessment and Plan:  1  Iron deficiency anemia secondary to inadequate dietary iron intake  Patient is not currently anemic with hemoglobin of 12 2 she may have had some benefit from her oral iron that she has been taking twice a day  Continues to have significant iron deficiency with a ferritin of 5  We will correct patient's iron deficiency with IV iron Injectafer 750 mg weekly for 2 treatments  She was instructed on the iron product, administration and common adverse effects  Patient was advised to stop the oral iron since she continues to be iron deficient and now experiencing some epigastric discomfort  Recommended starting over-the-counter Prilosec to see if this will improve her symptom otherwise follow-up with PCP  Etiology of patient's iron deficiency is most likely malabsorption of nutrients with her history of gastric bypass surgery/IBS along with heavy menstrual bleeding  She will be back for follow-up again in about 3 months with repeat laboratory studies prior  We will also pursue extensive hematological workup to rule out other etiologies of her anemia/leukocytosis  - CBC and differential; Future  - Comprehensive metabolic panel; Future  - Copper Level; Future  - C-reactive protein; Future  - Sedimentation rate, automated; Future  - Direct antiglobulin test; Future  - Erythropoietin; Future  - Ferritin; Future  - Folate; Future  - IgG, IgA, IgM; Future  - Immunoglobulin free LT chains blood; Future  - Iron Panel (Includes Ferritin, Iron Sat%, Iron, and TIBC); Future  - LD,Blood; Future  - Occult Blood, Fecal Immunochemical; Future  - Protein electrophoresis, serum; Future  - Reticulocytes; Future  - Vitamin B12; Future    2  Other elevated white blood cell (WBC) count  Upon review of past laboratory studies seems to be a chronic process at least since 2013   Most likely reactive process due to chronic tobacco abuse and/or chronic inflammation  Additional workup to be done prior to her follow-up appointment  We will continue to monitor     - CBC and differential; Future  - Comprehensive metabolic panel; Future  - Copper Level; Future  - C-reactive protein; Future  - Sedimentation rate, automated; Future  - Erythropoietin; Future  - IgG, IgA, IgM; Future  - Immunoglobulin free LT chains blood; Future  - LD,Blood; Future  - Protein electrophoresis, serum; Future  - Vitamin B12; Future  - PAM Screen w/ Reflex to Titer/Pattern; Future  - RF Screen w/ Reflex to Titer; Future    3  B12 deficiency  Patient was advised to start over-the-counter sublingual vitamin B12 supplement on a daily basis minimum dose of 1000 mg  We will also administer vitamin B12 injections weekly x2 with each iron treatment  - Vitamin B12; Future    4  H/O gastric bypass  Status post Mona-en-Y 2017     5  Other insomnia  Patient reports that she is suffering from insomnia  Admits that she does a lot of nighttime stressing/thinking  She tried Cymbalta for her anxiety/depression as well as for her fibromyalgia which she did not tolerate  Occasionally takes Benadryl at bedtime with some relief  Does admit to consuming caffeinated beverages up until 5-6 p m on almost a daily basis  Encouraged patient to stop drinking caffeinated beverages around 1:00 p m as the effects of caffeine can peak up to 5 hours later  Also encouraged her to avoid screen time 1-2 hours prior to bedtime  She could try relaxing before bedtime by reading a book, drinking camomile tea or taking a hot bath  I also highly encouraged patient to consider counseling sessions/psychotherapy regarding her anxiety and other past traumatic events that seem to be affecting her mental health  HPI:  Patient is a pleasant 69-year-old female who presents for further evaluation of her leukocytosis and iron deficiency    She has a past medical history of PCOS, GERD, arthritis, fibromyalgia, IBS, anxiety disorder, and Mona-en-Y gastric bypass surgery 2017  She is a smoker and smokes 1 pack per day for 20+ years  Denies ever being told that she has issues with her white cells up until recently  Was told that she had low iron with her 2nd pregnancy more than 20 years ago which was treated/corrected with oral iron  She had a history of 4 pregnancies resulting in live births and 3 miscarriages all occurring less than 10 weeks gestation  She is currently taking a multivitamin daily along with oral iron twice a day  Patient denies bleeding from any site other than her menstrual cycles  Reports that her menstrual cycles are regular but heavy due to her PCOS; the typically last 7 days with the 1st 5 days being heavy with clots to the point she is changing her pad/tampon every 1 hour  Admits to cold intolerance, severe fatigue and Pica for ice breakers mints  Her most recent laboratory studies from 01/27/2020 showed elevated WBC 12 85 mainly neutrophilia without any hint of immature cells on the peripheral blood, platelet count is normal 246 she is not anemic H&H 12 2/39 5 MCV 83  She is iron deficient with a ferritin of 5, iron saturation 20%, TIBC 415, serum iron 84  She had additional laboratory studies done on 10/11/2019 which showed low vitamin G68 895, folic acid was appropriate  Her celiac antibody profile is normal        ROS: Review of Systems   Constitutional: Positive for fatigue  Negative for activity change, appetite change, chills, fever and unexpected weight change  Reports hot flashes   HENT: Negative for congestion, mouth sores, nosebleeds, sore throat and trouble swallowing  Eyes: Negative  Respiratory: Positive for cough (mild)  Negative for chest tightness and shortness of breath  Cardiovascular: Negative for chest pain, palpitations and leg swelling     Gastrointestinal: Negative for abdominal distention, abdominal pain, blood in stool, constipation, diarrhea, nausea and vomiting  Endocrine: Positive for cold intolerance  Genitourinary: Positive for menstrual problem  Negative for difficulty urinating, dysuria, frequency, hematuria and urgency  Musculoskeletal: Negative for arthralgias, back pain, gait problem, joint swelling and myalgias  Skin: Negative for color change, pallor and rash  Neurological: Positive for headaches (mild occasional)  Negative for dizziness, weakness, light-headedness and numbness  Hematological: Negative for adenopathy  Does not bruise/bleed easily  Psychiatric/Behavioral: Positive for dysphoric mood and sleep disturbance  The patient is nervous/anxious          Past Medical History:   Diagnosis Date    Allergic 2019    Anemia 1999    During second pregnancy    Anxiety Years    I notice I get anxious over a lot of things    Arthritis 2007    Diabetes mellitus ([facility] [address] ) 2005,2013,2016    Two pregnancies and then in 2016    Fibromyalgia     GERD (gastroesophageal reflux disease) 2016    Obesity 2004    Restless leg syndrome     Shingles 2002       Past Surgical History:   Procedure Laterality Date    CHOLECYSTECTOMY      GASTRIC BYPASS         Social History     Socioeconomic History    Marital status: /Civil Union     Spouse name: Not on file    Number of children: Not on file    Years of education: Not on file    Highest education level: Not on file   Occupational History    Not on file   Social Needs    Financial resource strain: Not on file    Food insecurity:     Worry: Not on file     Inability: Not on file    Transportation needs:     Medical: Not on file     Non-medical: Not on file   Tobacco Use    Smoking status: Current Every Day Smoker     Packs/day: 1 00     Years: 25 00     Pack years: 25 00     Types: Cigarettes    Smokeless tobacco: Never Used   Substance and Sexual Activity    Alcohol use: Not Currently     Frequency: Never    Drug use: Never    Sexual activity: Yes     Partners: Male     Birth control/protection: Condom Male   Lifestyle    Physical activity:     Days per week: Not on file     Minutes per session: Not on file    Stress: Not on file   Relationships    Social connections:     Talks on phone: Not on file     Gets together: Not on file     Attends Faith service: Not on file     Active member of club or organization: Not on file     Attends meetings of clubs or organizations: Not on file     Relationship status: Not on file    Intimate partner violence:     Fear of current or ex partner: Not on file     Emotionally abused: Not on file     Physically abused: Not on file     Forced sexual activity: Not on file   Other Topics Concern    Not on file   Social History Narrative    Not on file       Family History   Problem Relation Age of Onset    Heart disease Mother     Anxiety disorder Mother     ADD / ADHD Father     Bipolar disorder Father     Suicide Attempts Father     Dementia Paternal Grandmother     Cancer Maternal Grandmother         Lung cancer    COPD Maternal Uncle     Early death Maternal Grandfather         Heart attack 38yrs old    Stroke Maternal Grandfather        No Known Allergies      Current Outpatient Medications:     Lactobacillus (PROBIOTIC ACIDOPHILUS) TABS, Take by mouth, Disp: , Rfl:     loratadine (CLARITIN) 10 mg tablet, Take 1 tablet (10 mg total) by mouth daily, Disp: 90 tablet, Rfl: 0    multivitamin (THERAGRAN) TABS, Take 1 tablet by mouth daily, Disp: , Rfl:     Calcium-Phosphorus-Vitamin D (CITRACAL +D3 PO), Take by mouth, Disp: , Rfl:     DULoxetine (CYMBALTA) 30 mg delayed release capsule, Take 1 capsule (30 mg total) by mouth every morning (Patient not taking: Reported on 2/21/2020), Disp: 30 capsule, Rfl: 1    QUEtiapine (SEROquel) 50 mg tablet, Take 1 tablet (50 mg total) by mouth daily at bedtime (Patient not taking: Reported on 2/21/2020), Disp: 90 tablet, Rfl: 0      Physical Exam:  /82 (BP Location: Left arm, Cuff Size: Adult)   Pulse (!) 106   Temp (!) 96 1 °F (35 6 °C) (Tympanic)   Resp 16   Ht 5' 7" (1 702 m)   Wt 67 3 kg (148 lb 6 4 oz)   LMP 02/16/2020   SpO2 98%   BMI 23 24 kg/m²     Physical Exam   Constitutional: She is oriented to person, place, and time  She appears well-developed and well-nourished  No distress  Looks exhausted   HENT:   Head: Normocephalic and atraumatic  Mouth/Throat: Oropharynx is clear and moist  No oropharyngeal exudate  Eyes: Pupils are equal, round, and reactive to light  Conjunctivae are normal  No scleral icterus  Neck: Normal range of motion  Neck supple  No thyromegaly present  Cardiovascular: Normal rate, regular rhythm, normal heart sounds and intact distal pulses  No murmur heard  Pulmonary/Chest: Effort normal and breath sounds normal  No respiratory distress  Abdominal: Soft  Bowel sounds are normal  She exhibits no distension  There is no hepatosplenomegaly  There is tenderness (mild epigastric)  Musculoskeletal: Normal range of motion  She exhibits no edema  Lymphadenopathy:     She has no cervical adenopathy  She has no axillary adenopathy  Neurological: She is alert and oriented to person, place, and time  Skin: Skin is warm and dry  No rash noted  She is not diaphoretic  No erythema  No pallor  Psychiatric: Her behavior is normal  Judgment and thought content normal  Her mood appears anxious  Her affect is blunt  Vitals reviewed          Labs:  Lab Results   Component Value Date    WBC 12 85 (H) 01/27/2020    HGB 12 2 01/27/2020    HCT 39 5 01/27/2020    MCV 83 01/27/2020     01/27/2020     Lab Results   Component Value Date    K 4 0 02/21/2020     02/21/2020    CO2 27 02/21/2020    BUN 10 02/21/2020    CREATININE 0 73 02/21/2020    GLUF 84 10/11/2019    CALCIUM 9 2 02/21/2020    AST 11 10/11/2019    ALT 23 10/11/2019    ALKPHOS 99 10/11/2019    EGFR 103 02/21/2020       Patient voiced understanding and agreement in the above discussion  Aware to contact our office with questions/symptoms in the interim  This note has been generated by voice recognition software system  Therefore, there may be spelling, grammar, and or syntax errors  Please contact if questions arise

## 2020-03-04 ENCOUNTER — HOSPITAL ENCOUNTER (OUTPATIENT)
Dept: INFUSION CENTER | Facility: HOSPITAL | Age: 40
Discharge: HOME | End: 2020-03-04
Payer: OTHER GOVERNMENT

## 2020-03-04 VITALS
HEART RATE: 78 BPM | TEMPERATURE: 97 F | OXYGEN SATURATION: 100 % | DIASTOLIC BLOOD PRESSURE: 60 MMHG | SYSTOLIC BLOOD PRESSURE: 122 MMHG | RESPIRATION RATE: 16 BRPM

## 2020-03-04 DIAGNOSIS — D50.8 IRON DEFICIENCY ANEMIA SECONDARY TO INADEQUATE DIETARY IRON INTAKE: ICD-10-CM

## 2020-03-04 DIAGNOSIS — E53.8 B12 DEFICIENCY: ICD-10-CM

## 2020-03-04 DIAGNOSIS — D50.8 IRON DEFICIENCY ANEMIA SECONDARY TO INADEQUATE DIETARY IRON INTAKE: Primary | ICD-10-CM

## 2020-03-04 PROCEDURE — 96372 THER/PROPH/DIAG INJ SC/IM: CPT

## 2020-03-04 PROCEDURE — 96365 THER/PROPH/DIAG IV INF INIT: CPT

## 2020-03-04 RX ORDER — CYANOCOBALAMIN 1000 UG/ML
1000 INJECTION INTRAMUSCULAR; SUBCUTANEOUS ONCE
Status: COMPLETED | OUTPATIENT
Start: 2020-03-04 | End: 2020-03-04

## 2020-03-04 RX ORDER — SODIUM CHLORIDE 9 MG/ML
20 INJECTION, SOLUTION INTRAVENOUS ONCE
Status: CANCELLED | OUTPATIENT
Start: 2020-03-12

## 2020-03-04 RX ORDER — SODIUM CHLORIDE 9 MG/ML
20 INJECTION, SOLUTION INTRAVENOUS ONCE
Status: COMPLETED | OUTPATIENT
Start: 2020-03-04 | End: 2020-03-04

## 2020-03-04 RX ORDER — CYANOCOBALAMIN 1000 UG/ML
1000 INJECTION INTRAMUSCULAR; SUBCUTANEOUS ONCE
Status: CANCELLED | OUTPATIENT
Start: 2020-03-12

## 2020-03-04 RX ADMIN — SODIUM CHLORIDE 20 ML/HR: 0.9 INJECTION, SOLUTION INTRAVENOUS at 13:01

## 2020-03-04 RX ADMIN — FERRIC CARBOXYMALTOSE INJECTION 750 MG: 50 INJECTION, SOLUTION INTRAVENOUS at 13:32

## 2020-03-04 RX ADMIN — CYANOCOBALAMIN 1000 MCG: 1000 INJECTION, SOLUTION INTRAMUSCULAR at 14:16

## 2020-03-05 ENCOUNTER — HOSPITAL ENCOUNTER (OUTPATIENT)
Dept: INFUSION CENTER | Facility: HOSPITAL | Age: 40
Discharge: HOME | End: 2020-03-05

## 2020-03-12 ENCOUNTER — HOSPITAL ENCOUNTER (OUTPATIENT)
Dept: INFUSION CENTER | Facility: HOSPITAL | Age: 40
Discharge: HOME | End: 2020-03-12
Payer: OTHER GOVERNMENT

## 2020-03-12 ENCOUNTER — HOSPITAL ENCOUNTER (OUTPATIENT)
Dept: INFUSION CENTER | Facility: HOSPITAL | Age: 40
Discharge: HOME | End: 2020-03-12

## 2020-03-12 VITALS
OXYGEN SATURATION: 100 % | SYSTOLIC BLOOD PRESSURE: 119 MMHG | HEART RATE: 77 BPM | DIASTOLIC BLOOD PRESSURE: 70 MMHG | RESPIRATION RATE: 18 BRPM | TEMPERATURE: 97.3 F

## 2020-03-12 DIAGNOSIS — E53.8 B12 DEFICIENCY: ICD-10-CM

## 2020-03-12 DIAGNOSIS — D50.8 IRON DEFICIENCY ANEMIA SECONDARY TO INADEQUATE DIETARY IRON INTAKE: Primary | ICD-10-CM

## 2020-03-12 PROCEDURE — 96365 THER/PROPH/DIAG IV INF INIT: CPT

## 2020-03-12 PROCEDURE — 96372 THER/PROPH/DIAG INJ SC/IM: CPT

## 2020-03-12 RX ORDER — SODIUM CHLORIDE 9 MG/ML
20 INJECTION, SOLUTION INTRAVENOUS ONCE
Status: COMPLETED | OUTPATIENT
Start: 2020-03-12 | End: 2020-03-12

## 2020-03-12 RX ORDER — SODIUM CHLORIDE 9 MG/ML
20 INJECTION, SOLUTION INTRAVENOUS ONCE
Status: CANCELLED | OUTPATIENT
Start: 2020-03-18

## 2020-03-12 RX ORDER — CYANOCOBALAMIN 1000 UG/ML
1000 INJECTION INTRAMUSCULAR; SUBCUTANEOUS ONCE
Status: COMPLETED | OUTPATIENT
Start: 2020-03-12 | End: 2020-03-12

## 2020-03-12 RX ORDER — CYANOCOBALAMIN 1000 UG/ML
1000 INJECTION INTRAMUSCULAR; SUBCUTANEOUS ONCE
Status: CANCELLED | OUTPATIENT
Start: 2020-03-18

## 2020-03-12 RX ADMIN — FERRIC CARBOXYMALTOSE INJECTION 750 MG: 50 INJECTION, SOLUTION INTRAVENOUS at 14:47

## 2020-03-12 RX ADMIN — CYANOCOBALAMIN 1000 MCG: 1000 INJECTION, SOLUTION INTRAMUSCULAR at 15:26

## 2020-03-12 RX ADMIN — SODIUM CHLORIDE 20 ML/HR: 0.9 INJECTION, SOLUTION INTRAVENOUS at 14:40

## 2020-05-20 ENCOUNTER — TELEPHONE (OUTPATIENT)
Dept: HEMATOLOGY ONCOLOGY | Facility: CLINIC | Age: 40
End: 2020-05-20

## 2020-06-15 ENCOUNTER — TELEPHONE (OUTPATIENT)
Dept: HEMATOLOGY ONCOLOGY | Facility: CLINIC | Age: 40
End: 2020-06-15

## 2020-06-15 ENCOUNTER — APPOINTMENT (OUTPATIENT)
Dept: LAB | Facility: CLINIC | Age: 40
End: 2020-06-15
Payer: OTHER GOVERNMENT

## 2020-06-15 DIAGNOSIS — D72.828 OTHER ELEVATED WHITE BLOOD CELL (WBC) COUNT: ICD-10-CM

## 2020-06-15 DIAGNOSIS — D50.8 IRON DEFICIENCY ANEMIA SECONDARY TO INADEQUATE DIETARY IRON INTAKE: ICD-10-CM

## 2020-06-15 DIAGNOSIS — E53.8 B12 DEFICIENCY: ICD-10-CM

## 2020-06-15 LAB
ALBUMIN SERPL BCP-MCNC: 3.9 G/DL (ref 3.5–5)
ALP SERPL-CCNC: 96 U/L (ref 46–116)
ALT SERPL W P-5'-P-CCNC: 29 U/L (ref 12–78)
ANION GAP SERPL CALCULATED.3IONS-SCNC: 6 MMOL/L (ref 4–13)
AST SERPL W P-5'-P-CCNC: 16 U/L (ref 5–45)
BASOPHILS # BLD AUTO: 0.06 THOUSANDS/ΜL (ref 0–0.1)
BASOPHILS NFR BLD AUTO: 1 % (ref 0–1)
BILIRUB SERPL-MCNC: 0.41 MG/DL (ref 0.2–1)
BUN SERPL-MCNC: 10 MG/DL (ref 5–25)
CALCIUM SERPL-MCNC: 8.8 MG/DL (ref 8.3–10.1)
CHLORIDE SERPL-SCNC: 106 MMOL/L (ref 100–108)
CO2 SERPL-SCNC: 27 MMOL/L (ref 21–32)
CREAT SERPL-MCNC: 0.68 MG/DL (ref 0.6–1.3)
CRP SERPL QL: <3 MG/L
DAT POLY-SP REAG RBC QL: NEGATIVE
EOSINOPHIL # BLD AUTO: 0.09 THOUSAND/ΜL (ref 0–0.61)
EOSINOPHIL NFR BLD AUTO: 1 % (ref 0–6)
ERYTHROCYTE [DISTWIDTH] IN BLOOD BY AUTOMATED COUNT: 13.6 % (ref 11.6–15.1)
ERYTHROCYTE [SEDIMENTATION RATE] IN BLOOD: 7 MM/HOUR (ref 0–20)
FERRITIN SERPL-MCNC: 59 NG/ML (ref 8–388)
FOLATE SERPL-MCNC: 10.4 NG/ML (ref 3.1–17.5)
GFR SERPL CREATININE-BSD FRML MDRD: 109 ML/MIN/1.73SQ M
GLUCOSE P FAST SERPL-MCNC: 85 MG/DL (ref 65–99)
HCT VFR BLD AUTO: 43.2 % (ref 34.8–46.1)
HGB BLD-MCNC: 14 G/DL (ref 11.5–15.4)
IGA SERPL-MCNC: 296 MG/DL (ref 70–400)
IGG SERPL-MCNC: 1110 MG/DL (ref 700–1600)
IGM SERPL-MCNC: 75 MG/DL (ref 40–230)
IMM GRANULOCYTES # BLD AUTO: 0.02 THOUSAND/UL (ref 0–0.2)
IMM GRANULOCYTES NFR BLD AUTO: 0 % (ref 0–2)
IRON SATN MFR SERPL: 23 %
IRON SERPL-MCNC: 63 UG/DL (ref 50–170)
LDH SERPL-CCNC: 136 U/L (ref 81–234)
LYMPHOCYTES # BLD AUTO: 2.98 THOUSANDS/ΜL (ref 0.6–4.47)
LYMPHOCYTES NFR BLD AUTO: 44 % (ref 14–44)
MCH RBC QN AUTO: 30.7 PG (ref 26.8–34.3)
MCHC RBC AUTO-ENTMCNC: 32.4 G/DL (ref 31.4–37.4)
MCV RBC AUTO: 95 FL (ref 82–98)
MONOCYTES # BLD AUTO: 0.4 THOUSAND/ΜL (ref 0.17–1.22)
MONOCYTES NFR BLD AUTO: 6 % (ref 4–12)
NEUTROPHILS # BLD AUTO: 3.23 THOUSANDS/ΜL (ref 1.85–7.62)
NEUTS SEG NFR BLD AUTO: 48 % (ref 43–75)
NRBC BLD AUTO-RTO: 0 /100 WBCS
PLATELET # BLD AUTO: 176 THOUSANDS/UL (ref 149–390)
PMV BLD AUTO: 11.4 FL (ref 8.9–12.7)
POTASSIUM SERPL-SCNC: 3.9 MMOL/L (ref 3.5–5.3)
PROT SERPL-MCNC: 7.5 G/DL (ref 6.4–8.2)
RBC # BLD AUTO: 4.56 MILLION/UL (ref 3.81–5.12)
RETICS # AUTO: NORMAL 10*3/UL (ref 14097–95744)
RETICS # CALC: 0.92 % (ref 0.37–1.87)
SODIUM SERPL-SCNC: 139 MMOL/L (ref 136–145)
TIBC SERPL-MCNC: 279 UG/DL (ref 250–450)
VIT B12 SERPL-MCNC: 437 PG/ML (ref 100–900)
WBC # BLD AUTO: 6.78 THOUSAND/UL (ref 4.31–10.16)

## 2020-06-15 PROCEDURE — 86038 ANTINUCLEAR ANTIBODIES: CPT

## 2020-06-15 PROCEDURE — 86140 C-REACTIVE PROTEIN: CPT

## 2020-06-15 PROCEDURE — 83883 ASSAY NEPHELOMETRY NOT SPEC: CPT

## 2020-06-15 PROCEDURE — 86880 COOMBS TEST DIRECT: CPT

## 2020-06-15 PROCEDURE — 84165 PROTEIN E-PHORESIS SERUM: CPT

## 2020-06-15 PROCEDURE — 82784 ASSAY IGA/IGD/IGG/IGM EACH: CPT

## 2020-06-15 PROCEDURE — 83550 IRON BINDING TEST: CPT

## 2020-06-15 PROCEDURE — 82525 ASSAY OF COPPER: CPT

## 2020-06-15 PROCEDURE — 86430 RHEUMATOID FACTOR TEST QUAL: CPT

## 2020-06-15 PROCEDURE — 36415 COLL VENOUS BLD VENIPUNCTURE: CPT

## 2020-06-15 PROCEDURE — 85025 COMPLETE CBC W/AUTO DIFF WBC: CPT

## 2020-06-15 PROCEDURE — 80053 COMPREHEN METABOLIC PANEL: CPT

## 2020-06-15 PROCEDURE — 82668 ASSAY OF ERYTHROPOIETIN: CPT

## 2020-06-15 PROCEDURE — 85045 AUTOMATED RETICULOCYTE COUNT: CPT

## 2020-06-15 PROCEDURE — 82607 VITAMIN B-12: CPT

## 2020-06-15 PROCEDURE — 84165 PROTEIN E-PHORESIS SERUM: CPT | Performed by: PATHOLOGY

## 2020-06-15 PROCEDURE — 82728 ASSAY OF FERRITIN: CPT

## 2020-06-15 PROCEDURE — 83615 LACTATE (LD) (LDH) ENZYME: CPT

## 2020-06-15 PROCEDURE — 82746 ASSAY OF FOLIC ACID SERUM: CPT

## 2020-06-15 PROCEDURE — 85652 RBC SED RATE AUTOMATED: CPT

## 2020-06-15 PROCEDURE — 83540 ASSAY OF IRON: CPT

## 2020-06-16 ENCOUNTER — OFFICE VISIT (OUTPATIENT)
Dept: HEMATOLOGY ONCOLOGY | Facility: CLINIC | Age: 40
End: 2020-06-16
Payer: OTHER GOVERNMENT

## 2020-06-16 VITALS
SYSTOLIC BLOOD PRESSURE: 112 MMHG | WEIGHT: 147.2 LBS | OXYGEN SATURATION: 98 % | HEIGHT: 67 IN | BODY MASS INDEX: 23.1 KG/M2 | TEMPERATURE: 97.2 F | HEART RATE: 99 BPM | RESPIRATION RATE: 18 BRPM | DIASTOLIC BLOOD PRESSURE: 72 MMHG

## 2020-06-16 DIAGNOSIS — D50.8 IRON DEFICIENCY ANEMIA SECONDARY TO INADEQUATE DIETARY IRON INTAKE: Primary | ICD-10-CM

## 2020-06-16 DIAGNOSIS — E53.8 B12 DEFICIENCY: ICD-10-CM

## 2020-06-16 DIAGNOSIS — Z98.84 H/O GASTRIC BYPASS: ICD-10-CM

## 2020-06-16 LAB
EPO SERPL-ACNC: 6.9 MIU/ML (ref 2.6–18.5)
KAPPA LC FREE SER-MCNC: 21.6 MG/L (ref 3.3–19.4)
KAPPA LC FREE/LAMBDA FREE SER: 1.36 {RATIO} (ref 0.26–1.65)
LAMBDA LC FREE SERPL-MCNC: 15.9 MG/L (ref 5.7–26.3)
RHEUMATOID FACT SER QL LA: NEGATIVE

## 2020-06-16 PROCEDURE — 3008F BODY MASS INDEX DOCD: CPT | Performed by: INTERNAL MEDICINE

## 2020-06-16 PROCEDURE — 4004F PT TOBACCO SCREEN RCVD TLK: CPT | Performed by: INTERNAL MEDICINE

## 2020-06-16 PROCEDURE — 99214 OFFICE O/P EST MOD 30 MIN: CPT | Performed by: INTERNAL MEDICINE

## 2020-06-17 LAB
ALBUMIN SERPL ELPH-MCNC: 4.4 G/DL (ref 3.5–5)
ALBUMIN SERPL ELPH-MCNC: 61.1 % (ref 52–65)
ALPHA1 GLOB SERPL ELPH-MCNC: 0.3 G/DL (ref 0.1–0.4)
ALPHA1 GLOB SERPL ELPH-MCNC: 4.2 % (ref 2.5–5)
ALPHA2 GLOB SERPL ELPH-MCNC: 0.68 G/DL (ref 0.4–1.2)
ALPHA2 GLOB SERPL ELPH-MCNC: 9.5 % (ref 7–13)
BETA GLOB ABNORMAL SERPL ELPH-MCNC: 0.41 G/DL (ref 0.4–0.8)
BETA1 GLOB SERPL ELPH-MCNC: 5.7 % (ref 5–13)
BETA2 GLOB SERPL ELPH-MCNC: 5 % (ref 2–8)
BETA2+GAMMA GLOB SERPL ELPH-MCNC: 0.36 G/DL (ref 0.2–0.5)
GAMMA GLOB ABNORMAL SERPL ELPH-MCNC: 1.04 G/DL (ref 0.5–1.6)
GAMMA GLOB SERPL ELPH-MCNC: 14.5 % (ref 12–22)
IGG/ALB SER: 1.57 {RATIO} (ref 1.1–1.8)
PROT PATTERN SERPL ELPH-IMP: NORMAL
PROT SERPL-MCNC: 7.2 G/DL (ref 6.4–8.2)
RYE IGE QN: NEGATIVE

## 2020-06-18 LAB — COPPER SERPL-MCNC: 118 UG/DL (ref 72–166)

## 2020-07-30 ENCOUNTER — TELEPHONE (OUTPATIENT)
Dept: INTERNAL MEDICINE CLINIC | Facility: CLINIC | Age: 40
End: 2020-07-30

## 2020-07-30 DIAGNOSIS — D22.9 ATYPICAL MOLE: Primary | ICD-10-CM

## 2020-07-30 NOTE — TELEPHONE ENCOUNTER
Pt called office asking if you would give her a referral to dermatology  She has a couple of moles on her face and back she would like to have checked  If you are ok with providing could you put order in chart and I will call her and let her know   Thanks

## 2020-07-30 NOTE — TELEPHONE ENCOUNTER
Please call pt and tell her I referred her to Dr Darrin Mcnair not sure if he only goes to [facility] otherwise she can be provided with [facility] phone number

## 2020-09-11 ENCOUNTER — OFFICE VISIT (OUTPATIENT)
Dept: OBGYN CLINIC | Facility: CLINIC | Age: 40
End: 2020-09-11
Payer: OTHER GOVERNMENT

## 2020-09-11 VITALS — SYSTOLIC BLOOD PRESSURE: 140 MMHG | TEMPERATURE: 97.8 F | DIASTOLIC BLOOD PRESSURE: 70 MMHG

## 2020-09-11 DIAGNOSIS — Z01.419 ENCOUNTER FOR GYNECOLOGICAL EXAMINATION WITH PAPANICOLAOU SMEAR OF CERVIX: Primary | ICD-10-CM

## 2020-09-11 DIAGNOSIS — Z12.31 ENCOUNTER FOR SCREENING MAMMOGRAM FOR MALIGNANT NEOPLASM OF BREAST: ICD-10-CM

## 2020-09-11 PROCEDURE — 99386 PREV VISIT NEW AGE 40-64: CPT | Performed by: ADVANCED PRACTICE MIDWIFE

## 2020-09-11 RX ORDER — ACETAMINOPHEN AND CODEINE PHOSPHATE 300; 30 MG/1; MG/1
1 TABLET ORAL
COMMUNITY
Start: 2020-09-10

## 2020-09-11 NOTE — PROGRESS NOTES
ASSESSMENT & PLAN: Brian Ramos is a 38 y o  [id number] with normal gynecologic exam     1   Routine well woman exam done today  2  Pap and HPV:  The patient's last pap and hpv was 2013  It was normal     Pap and cotesting was done today  Current ASCCP Guidelines reviewed  Due to history of abnormal pap smears, we will await result of current testing to determine when next pap needs to be done  3   Mammogram ordered  4  The following were reviewed in today's visit: breast self exam, mammography screening ordered, adequate intake of calcium and vitamin D, exercise and healthy diet  5  Notes heavy bleeding with regular menses  Discussed ablation and encouraged follow-up visit if desires  6   States maternal aunt had breast cancer at an early age  She will ask if BRCA testing was done  CC:  Annual Gynecologic Examination    HPI: Brian Ramos is a 38 y o  [id number] who presents for annual gynecologic examination  She has the following concerns:  Pap smear and mammogram      Health Maintenance:    She wears her seatbelt routinely  She does perform irregular monthly self breast exams  She feels safe at home       Patient Active Problem List   Diagnosis    Elevated alkaline phosphatase level    History of gestational diabetes mellitus, not pregnant    Tobacco use disorder    Vitamin D deficiency    Fibromyalgia    H/O gastric bypass    Other insomnia    Restless leg syndrome    History of cholecystectomy    Iron deficiency anemia secondary to inadequate dietary iron intake    B12 deficiency       Past Medical History:   Diagnosis Date    Allergic 2019    Anemia 1999    During second pregnancy    Anxiety Years    I notice I get anxious over a lot of things    Arthritis 2007    Diabetes mellitus ([address] ) 2005,2013,2016    Two pregnancies and then in 2016    Fibromyalgia     GERD (gastroesophageal reflux disease) 2016    Obesity 2004    Restless leg syndrome     Shingles 2002       Past Surgical History:   Procedure Laterality Date    CHOLECYSTECTOMY      GASTRIC BYPASS         Past OB/Gyn History:  OB History        7    Para   4    Term   3       1    AB   3    Living   4       SAB   3    TAB        Ectopic        Multiple        Live Births   4                  Pt has menstrual issues  Heavy menses  History of sexually transmitted infection: No   History of abnormal pap smears: Yes 2009 precancerous cells, based on patient recall   Patient is currently sexually active  heterosexual and  monogamous  years  The current method of family planning is none      Family History   Problem Relation Age of Onset    Heart disease Mother     Anxiety disorder Mother     ADD / ADHD Father     Bipolar disorder Father     Suicide Attempts Father     Dementia Paternal Grandmother     Cancer Maternal Grandmother         Lung cancer    COPD Maternal Uncle     Early death Maternal Grandfather         Heart attack 38yrs old    Stroke Maternal Grandfather        Social History:  Social History     Socioeconomic History    Marital status: /Civil Union     Spouse name: Not on file    Number of children: Not on file    Years of education: Not on file    Highest education level: Not on file   Occupational History    Not on file   Social Needs    Financial resource strain: Not on file    Food insecurity     Worry: Not on file     Inability: Not on file   [facility] needs     Medical: Not on file     Non-medical: Not on file   Tobacco Use    Smoking status: Current Every Day Smoker     Packs/day: 1 00     Years: 25 00     Pack years: 25 00     Types: Cigarettes    Smokeless tobacco: Never Used   Substance and Sexual Activity    Alcohol use: Not Currently     Frequency: Never    Drug use: Never    Sexual activity: Yes     Partners: Male     Birth control/protection: Condom Male   Lifestyle    Physical activity     Days per week: Not on file     Minutes per session: Not on file    Stress: Not on file   Relationships    Social connections     Talks on phone: Not on file     Gets together: Not on file     Attends Christianity service: Not on file     Active member of club or organization: Not on file     Attends meetings of clubs or organizations: Not on file     Relationship status: Not on file    Intimate partner violence     Fear of current or ex partner: Not on file     Emotionally abused: Not on file     Physically abused: Not on file     Forced sexual activity: Not on file   Other Topics Concern    Not on file   Social History Narrative    Not on file     Presently lives with  and children  Patient is   Patient is currently employed   No Known Allergies    Current Outpatient Medications:     Calcium-Phosphorus-Vitamin D (CITRACAL +D3 PO), Take by mouth, Disp: , Rfl:     Lactobacillus (PROBIOTIC ACIDOPHILUS) TABS, Take by mouth, Disp: , Rfl:     multivitamin (THERAGRAN) TABS, Take 1 tablet by mouth daily, Disp: , Rfl:     QUEtiapine (SEROquel) 50 mg tablet, Take 1 tablet (50 mg total) by mouth daily at bedtime (Patient taking differently: Take 50 mg by mouth as needed ), Disp: 90 tablet, Rfl: 0    acetaminophen-codeine (TYLENOL #3) 300-30 mg per tablet, Take 1 tablet by mouth every 4 to 6 hours if needed for pain, Disp: , Rfl:     DULoxetine (CYMBALTA) 30 mg delayed release capsule, Take 1 capsule (30 mg total) by mouth every morning (Patient not taking: Reported on 2/21/2020), Disp: 30 capsule, Rfl: 1    loratadine (CLARITIN) 10 mg tablet, Take 1 tablet (10 mg total) by mouth daily, Disp: 90 tablet, Rfl: 0    Review of Systems:    Review of Systems  Constitutional :no fever, feels well, no tiredness, no recent weight gain or loss  ENT: no ear ache, no loss of hearing, no nosebleeds or nasal discharge, no sore throat or hoarseness    Cardiovascular: no complaints of slow or fast heart beat, no chest pain, no palpitations, no leg claudication or lower extremity edema  Respiratory: no complaints of shortness of shortness of breath, no JUDGE  Breasts:no complaints of breast pain, breast lump, or nipple discharge  Gastrointestinal: no complaints of abdominal pain, constipation, nausea, vomiting, or diarrhea or bloody stools  Genitourinary : no complaints of dysuria, incontinence, pelvic pain, no dysmenorrhea, vaginal discharge or abnormal vaginal bleeding and as noted in HPI  Musculoskeletal: no complaints of arthralgia, no myalgia, no joint swelling or stiffness, no limb pain or swelling  Integumentary: no complaints of skin rash or lesion, itching or dry skin  Neurological: no complaints of headache, no confusion, no numbness or tingling, no dizziness or fainting    Objective      /70   Temp 97 8 °F (36 6 °C)   LMP 09/03/2020 (Exact Date)     General:   appears stated age, cooperative, alert normal mood and affect   Neck: normal, supple,trachea midline, no masses   Heart: regular rate and rhythm, S1, S2 normal, no murmur, click, rub or gallop   Lungs: clear to auscultation bilaterally   Breasts: normal appearance, no masses or tenderness   Abdomen: soft, non-tender, without masses or organomegaly   Vulva: normal female genitalia   Vagina: normal vagina, no discharge, exudate, lesion, or erythema   Urethra: normal   Cervix: Normal, no discharge  PAP done  Uterus: normal size, contour, position, consistency, mobility, non-tender   Adnexa: no mass, fullness, tenderness   Lymphatic palpation of lymph nodes in neck, axilla, groin and/or other locations: no lymphadenopathy or masses noted   Skin normal skin turgor and no rashes     Psychiatric orientation to person, place, and time: normal  mood and affect: normal

## 2020-09-15 LAB
CLINICAL INFO: NORMAL
CYTO CVX: NORMAL
CYTOLOGY CMNT CVX/VAG CYTO-IMP: NORMAL
DATE PREVIOUS BX: NORMAL
HPV E6+E7 MRNA CVX QL NAA+PROBE: NOT DETECTED
LMP START DATE: NORMAL
SL AMB PREV. PAP:: NORMAL
SPECIMEN SOURCE CVX/VAG CYTO: NORMAL

## 2020-10-28 ENCOUNTER — TELEPHONE (OUTPATIENT)
Dept: HEMATOLOGY ONCOLOGY | Facility: CLINIC | Age: 40
End: 2020-10-28

## 2020-10-30 ENCOUNTER — LAB (OUTPATIENT)
Dept: LAB | Facility: CLINIC | Age: 40
End: 2020-10-30
Payer: OTHER GOVERNMENT

## 2020-10-30 DIAGNOSIS — E53.8 B12 DEFICIENCY: ICD-10-CM

## 2020-10-30 DIAGNOSIS — D50.8 IRON DEFICIENCY ANEMIA SECONDARY TO INADEQUATE DIETARY IRON INTAKE: ICD-10-CM

## 2020-10-30 LAB
ALBUMIN SERPL BCP-MCNC: 3.9 G/DL (ref 3.5–5)
ALP SERPL-CCNC: 95 U/L (ref 46–116)
ALT SERPL W P-5'-P-CCNC: 22 U/L (ref 12–78)
ANION GAP SERPL CALCULATED.3IONS-SCNC: 5 MMOL/L (ref 4–13)
AST SERPL W P-5'-P-CCNC: 11 U/L (ref 5–45)
BASOPHILS # BLD AUTO: 0.05 THOUSANDS/ΜL (ref 0–0.1)
BASOPHILS NFR BLD AUTO: 1 % (ref 0–1)
BILIRUB SERPL-MCNC: 0.75 MG/DL (ref 0.2–1)
BUN SERPL-MCNC: 11 MG/DL (ref 5–25)
CALCIUM SERPL-MCNC: 8.9 MG/DL (ref 8.3–10.1)
CHLORIDE SERPL-SCNC: 108 MMOL/L (ref 100–108)
CO2 SERPL-SCNC: 29 MMOL/L (ref 21–32)
CREAT SERPL-MCNC: 0.77 MG/DL (ref 0.6–1.3)
EOSINOPHIL # BLD AUTO: 0.09 THOUSAND/ΜL (ref 0–0.61)
EOSINOPHIL NFR BLD AUTO: 1 % (ref 0–6)
ERYTHROCYTE [DISTWIDTH] IN BLOOD BY AUTOMATED COUNT: 13.1 % (ref 11.6–15.1)
FERRITIN SERPL-MCNC: 10 NG/ML (ref 8–388)
GFR SERPL CREATININE-BSD FRML MDRD: 96 ML/MIN/1.73SQ M
GLUCOSE P FAST SERPL-MCNC: 91 MG/DL (ref 65–99)
HCT VFR BLD AUTO: 42.5 % (ref 34.8–46.1)
HGB BLD-MCNC: 13.8 G/DL (ref 11.5–15.4)
IMM GRANULOCYTES # BLD AUTO: 0.01 THOUSAND/UL (ref 0–0.2)
IMM GRANULOCYTES NFR BLD AUTO: 0 % (ref 0–2)
IRON SATN MFR SERPL: 18 %
IRON SERPL-MCNC: 60 UG/DL (ref 50–170)
LDH SERPL-CCNC: 129 U/L (ref 81–234)
LYMPHOCYTES # BLD AUTO: 2.36 THOUSANDS/ΜL (ref 0.6–4.47)
LYMPHOCYTES NFR BLD AUTO: 31 % (ref 14–44)
MCH RBC QN AUTO: 30.3 PG (ref 26.8–34.3)
MCHC RBC AUTO-ENTMCNC: 32.5 G/DL (ref 31.4–37.4)
MCV RBC AUTO: 93 FL (ref 82–98)
MONOCYTES # BLD AUTO: 0.54 THOUSAND/ΜL (ref 0.17–1.22)
MONOCYTES NFR BLD AUTO: 7 % (ref 4–12)
NEUTROPHILS # BLD AUTO: 4.62 THOUSANDS/ΜL (ref 1.85–7.62)
NEUTS SEG NFR BLD AUTO: 60 % (ref 43–75)
NRBC BLD AUTO-RTO: 0 /100 WBCS
PLATELET # BLD AUTO: 195 THOUSANDS/UL (ref 149–390)
PMV BLD AUTO: 10.7 FL (ref 8.9–12.7)
POTASSIUM SERPL-SCNC: 4 MMOL/L (ref 3.5–5.3)
PROT SERPL-MCNC: 7.4 G/DL (ref 6.4–8.2)
RBC # BLD AUTO: 4.55 MILLION/UL (ref 3.81–5.12)
SODIUM SERPL-SCNC: 142 MMOL/L (ref 136–145)
TIBC SERPL-MCNC: 342 UG/DL (ref 250–450)
VIT B12 SERPL-MCNC: 376 PG/ML (ref 100–900)
WBC # BLD AUTO: 7.67 THOUSAND/UL (ref 4.31–10.16)

## 2020-10-30 PROCEDURE — 83550 IRON BINDING TEST: CPT

## 2020-10-30 PROCEDURE — 80053 COMPREHEN METABOLIC PANEL: CPT

## 2020-10-30 PROCEDURE — 83615 LACTATE (LD) (LDH) ENZYME: CPT

## 2020-10-30 PROCEDURE — 83540 ASSAY OF IRON: CPT

## 2020-10-30 PROCEDURE — 36415 COLL VENOUS BLD VENIPUNCTURE: CPT

## 2020-10-30 PROCEDURE — 82607 VITAMIN B-12: CPT

## 2020-10-30 PROCEDURE — 85025 COMPLETE CBC W/AUTO DIFF WBC: CPT

## 2020-10-30 PROCEDURE — 82728 ASSAY OF FERRITIN: CPT

## 2020-11-02 ENCOUNTER — TELEPHONE (OUTPATIENT)
Dept: HEMATOLOGY ONCOLOGY | Facility: CLINIC | Age: 40
End: 2020-11-02

## 2020-11-03 ENCOUNTER — OFFICE VISIT (OUTPATIENT)
Dept: HEMATOLOGY ONCOLOGY | Facility: CLINIC | Age: 40
End: 2020-11-03
Payer: OTHER GOVERNMENT

## 2020-11-03 VITALS
WEIGHT: 151.9 LBS | OXYGEN SATURATION: 98 % | HEART RATE: 90 BPM | DIASTOLIC BLOOD PRESSURE: 70 MMHG | HEIGHT: 67 IN | RESPIRATION RATE: 18 BRPM | TEMPERATURE: 98.7 F | BODY MASS INDEX: 23.84 KG/M2 | SYSTOLIC BLOOD PRESSURE: 130 MMHG

## 2020-11-03 DIAGNOSIS — D50.8 IRON DEFICIENCY ANEMIA SECONDARY TO INADEQUATE DIETARY IRON INTAKE: Primary | ICD-10-CM

## 2020-11-03 DIAGNOSIS — N92.0 MENORRHAGIA WITH REGULAR CYCLE: ICD-10-CM

## 2020-11-03 DIAGNOSIS — Z98.84 H/O GASTRIC BYPASS: ICD-10-CM

## 2020-11-03 DIAGNOSIS — E53.8 B12 DEFICIENCY: ICD-10-CM

## 2020-11-03 DIAGNOSIS — Z12.31 SCREENING MAMMOGRAM, ENCOUNTER FOR: ICD-10-CM

## 2020-11-03 PROCEDURE — 99214 OFFICE O/P EST MOD 30 MIN: CPT | Performed by: NURSE PRACTITIONER

## 2020-11-03 RX ORDER — CYANOCOBALAMIN 1000 UG/ML
1000 INJECTION INTRAMUSCULAR; SUBCUTANEOUS ONCE
Status: CANCELLED | OUTPATIENT
Start: 2020-12-15

## 2020-11-03 RX ORDER — OMEPRAZOLE 20 MG/1
10 CAPSULE, DELAYED RELEASE ORAL DAILY
COMMUNITY

## 2020-11-03 RX ORDER — CYANOCOBALAMIN 1000 UG/ML
1000 INJECTION INTRAMUSCULAR; SUBCUTANEOUS ONCE
Status: CANCELLED | OUTPATIENT
Start: 2020-11-11

## 2020-11-03 RX ORDER — SODIUM CHLORIDE 9 MG/ML
20 INJECTION, SOLUTION INTRAVENOUS ONCE
Status: CANCELLED | OUTPATIENT
Start: 2020-11-11

## 2020-11-10 ENCOUNTER — HOSPITAL ENCOUNTER (OUTPATIENT)
Dept: MAMMOGRAPHY | Facility: HOSPITAL | Age: 40
Discharge: HOME | End: 2020-11-10
Payer: OTHER GOVERNMENT

## 2020-11-10 VITALS — WEIGHT: 151 LBS | BODY MASS INDEX: 23.7 KG/M2 | HEIGHT: 67 IN

## 2020-11-10 DIAGNOSIS — Z12.31 SCREENING MAMMOGRAM, ENCOUNTER FOR: ICD-10-CM

## 2020-11-10 PROCEDURE — 77067 SCR MAMMO BI INCL CAD: CPT

## 2020-11-10 PROCEDURE — 77063 BREAST TOMOSYNTHESIS BI: CPT

## 2020-11-11 ENCOUNTER — TELEPHONE (OUTPATIENT)
Dept: HEMATOLOGY ONCOLOGY | Facility: CLINIC | Age: 40
End: 2020-11-11

## 2020-11-11 ENCOUNTER — HOSPITAL ENCOUNTER (OUTPATIENT)
Dept: INFUSION CENTER | Facility: HOSPITAL | Age: 40
Discharge: HOME | End: 2020-11-11
Payer: OTHER GOVERNMENT

## 2020-11-11 VITALS
OXYGEN SATURATION: 100 % | RESPIRATION RATE: 16 BRPM | TEMPERATURE: 96.8 F | HEART RATE: 82 BPM | SYSTOLIC BLOOD PRESSURE: 130 MMHG | DIASTOLIC BLOOD PRESSURE: 68 MMHG

## 2020-11-11 DIAGNOSIS — E53.8 B12 DEFICIENCY: ICD-10-CM

## 2020-11-11 DIAGNOSIS — D50.8 IRON DEFICIENCY ANEMIA SECONDARY TO INADEQUATE DIETARY IRON INTAKE: Primary | ICD-10-CM

## 2020-11-11 PROCEDURE — 96374 THER/PROPH/DIAG INJ IV PUSH: CPT

## 2020-11-11 PROCEDURE — 96372 THER/PROPH/DIAG INJ SC/IM: CPT

## 2020-11-11 RX ORDER — CYANOCOBALAMIN 1000 UG/ML
1000 INJECTION INTRAMUSCULAR; SUBCUTANEOUS ONCE
Status: CANCELLED | OUTPATIENT
Start: 2020-11-18

## 2020-11-11 RX ORDER — CYANOCOBALAMIN 1000 UG/ML
1000 INJECTION INTRAMUSCULAR; SUBCUTANEOUS ONCE
Status: COMPLETED | OUTPATIENT
Start: 2020-11-11 | End: 2020-11-11

## 2020-11-11 RX ORDER — SODIUM CHLORIDE 9 MG/ML
20 INJECTION, SOLUTION INTRAVENOUS ONCE
Status: CANCELLED | OUTPATIENT
Start: 2020-11-18

## 2020-11-11 RX ORDER — SODIUM CHLORIDE 9 MG/ML
20 INJECTION, SOLUTION INTRAVENOUS ONCE
Status: COMPLETED | OUTPATIENT
Start: 2020-11-11 | End: 2020-11-11

## 2020-11-11 RX ADMIN — SODIUM CHLORIDE 20 ML/HR: 0.9 INJECTION, SOLUTION INTRAVENOUS at 12:40

## 2020-11-11 RX ADMIN — FERRIC CARBOXYMALTOSE INJECTION 750 MG: 50 INJECTION, SOLUTION INTRAVENOUS at 12:57

## 2020-11-11 RX ADMIN — CYANOCOBALAMIN 1000 MCG: 1000 INJECTION, SOLUTION INTRAMUSCULAR; SUBCUTANEOUS at 13:29

## 2020-11-18 ENCOUNTER — HOSPITAL ENCOUNTER (OUTPATIENT)
Dept: INFUSION CENTER | Facility: HOSPITAL | Age: 40
Discharge: HOME | End: 2020-11-18
Payer: OTHER GOVERNMENT

## 2020-11-18 DIAGNOSIS — D50.8 IRON DEFICIENCY ANEMIA SECONDARY TO INADEQUATE DIETARY IRON INTAKE: Primary | ICD-10-CM

## 2020-11-18 DIAGNOSIS — E53.8 B12 DEFICIENCY: ICD-10-CM

## 2020-11-18 PROCEDURE — 96372 THER/PROPH/DIAG INJ SC/IM: CPT

## 2020-11-18 PROCEDURE — 96365 THER/PROPH/DIAG IV INF INIT: CPT

## 2020-11-18 RX ORDER — CYANOCOBALAMIN 1000 UG/ML
1000 INJECTION INTRAMUSCULAR; SUBCUTANEOUS ONCE
Status: COMPLETED | OUTPATIENT
Start: 2020-11-18 | End: 2020-11-18

## 2020-11-18 RX ORDER — CYANOCOBALAMIN 1000 UG/ML
1000 INJECTION INTRAMUSCULAR; SUBCUTANEOUS ONCE
Status: CANCELLED | OUTPATIENT
Start: 2020-11-25

## 2020-11-18 RX ORDER — SODIUM CHLORIDE 9 MG/ML
20 INJECTION, SOLUTION INTRAVENOUS ONCE
Status: CANCELLED | OUTPATIENT
Start: 2020-11-25

## 2020-11-18 RX ORDER — SODIUM CHLORIDE 9 MG/ML
20 INJECTION, SOLUTION INTRAVENOUS ONCE
Status: COMPLETED | OUTPATIENT
Start: 2020-11-18 | End: 2020-11-18

## 2020-11-18 RX ADMIN — FERRIC CARBOXYMALTOSE INJECTION 750 MG: 50 INJECTION, SOLUTION INTRAVENOUS at 14:46

## 2020-11-18 RX ADMIN — CYANOCOBALAMIN 1000 MCG: 1000 INJECTION, SOLUTION INTRAMUSCULAR; SUBCUTANEOUS at 15:09

## 2020-11-18 RX ADMIN — SODIUM CHLORIDE 20 ML/HR: 0.9 INJECTION, SOLUTION INTRAVENOUS at 14:50

## 2020-11-27 ENCOUNTER — HOSPITAL ENCOUNTER (OUTPATIENT)
Dept: MAMMOGRAPHY | Facility: HOSPITAL | Age: 40
Discharge: HOME | End: 2020-11-27
Payer: OTHER GOVERNMENT

## 2020-11-27 DIAGNOSIS — R92.8 ABNORMAL MAMMOGRAM: ICD-10-CM

## 2020-11-27 PROCEDURE — 77065 DX MAMMO INCL CAD UNI: CPT

## 2021-03-04 ENCOUNTER — LAB (OUTPATIENT)
Dept: LAB | Facility: CLINIC | Age: 41
End: 2021-03-04
Payer: OTHER GOVERNMENT

## 2021-03-04 DIAGNOSIS — D50.8 IRON DEFICIENCY ANEMIA SECONDARY TO INADEQUATE DIETARY IRON INTAKE: ICD-10-CM

## 2021-03-04 DIAGNOSIS — E53.8 B12 DEFICIENCY: ICD-10-CM

## 2021-03-04 DIAGNOSIS — N92.0 MENORRHAGIA WITH REGULAR CYCLE: ICD-10-CM

## 2021-03-04 LAB
ALBUMIN SERPL BCP-MCNC: 4 G/DL (ref 3.5–5)
ALP SERPL-CCNC: 104 U/L (ref 46–116)
ALT SERPL W P-5'-P-CCNC: 24 U/L (ref 12–78)
ANION GAP SERPL CALCULATED.3IONS-SCNC: 5 MMOL/L (ref 4–13)
APTT PPP: 29 SECONDS (ref 23–37)
AST SERPL W P-5'-P-CCNC: 11 U/L (ref 5–45)
BASOPHILS # BLD AUTO: 0.04 THOUSANDS/ΜL (ref 0–0.1)
BASOPHILS NFR BLD AUTO: 1 % (ref 0–1)
BILIRUB SERPL-MCNC: 0.87 MG/DL (ref 0.2–1)
BUN SERPL-MCNC: 17 MG/DL (ref 5–25)
CALCIUM SERPL-MCNC: 9.2 MG/DL (ref 8.3–10.1)
CHLORIDE SERPL-SCNC: 111 MMOL/L (ref 100–108)
CO2 SERPL-SCNC: 26 MMOL/L (ref 21–32)
CREAT SERPL-MCNC: 0.7 MG/DL (ref 0.6–1.3)
CRP SERPL QL: <3 MG/L
EOSINOPHIL # BLD AUTO: 0.09 THOUSAND/ΜL (ref 0–0.61)
EOSINOPHIL NFR BLD AUTO: 1 % (ref 0–6)
ERYTHROCYTE [DISTWIDTH] IN BLOOD BY AUTOMATED COUNT: 13 % (ref 11.6–15.1)
ERYTHROCYTE [SEDIMENTATION RATE] IN BLOOD: 7 MM/HOUR (ref 0–19)
FERRITIN SERPL-MCNC: 86 NG/ML (ref 8–388)
FIBRINOGEN PPP-MCNC: 335 MG/DL (ref 227–495)
GFR SERPL CREATININE-BSD FRML MDRD: 108 ML/MIN/1.73SQ M
GLUCOSE SERPL-MCNC: 71 MG/DL (ref 65–140)
HCT VFR BLD AUTO: 44.8 % (ref 34.8–46.1)
HGB BLD-MCNC: 14.6 G/DL (ref 11.5–15.4)
IMM GRANULOCYTES # BLD AUTO: 0.01 THOUSAND/UL (ref 0–0.2)
IMM GRANULOCYTES NFR BLD AUTO: 0 % (ref 0–2)
INR PPP: 1.03 (ref 0.84–1.19)
IRON SATN MFR SERPL: 43 %
IRON SERPL-MCNC: 126 UG/DL (ref 50–170)
LYMPHOCYTES # BLD AUTO: 2.38 THOUSANDS/ΜL (ref 0.6–4.47)
LYMPHOCYTES NFR BLD AUTO: 34 % (ref 14–44)
MCH RBC QN AUTO: 30.9 PG (ref 26.8–34.3)
MCHC RBC AUTO-ENTMCNC: 32.6 G/DL (ref 31.4–37.4)
MCV RBC AUTO: 95 FL (ref 82–98)
MONOCYTES # BLD AUTO: 0.43 THOUSAND/ΜL (ref 0.17–1.22)
MONOCYTES NFR BLD AUTO: 6 % (ref 4–12)
NEUTROPHILS # BLD AUTO: 4.07 THOUSANDS/ΜL (ref 1.85–7.62)
NEUTS SEG NFR BLD AUTO: 58 % (ref 43–75)
NRBC BLD AUTO-RTO: 0 /100 WBCS
PLATELET # BLD AUTO: 180 THOUSANDS/UL (ref 149–390)
PMV BLD AUTO: 10.7 FL (ref 8.9–12.7)
POTASSIUM SERPL-SCNC: 4.1 MMOL/L (ref 3.5–5.3)
PROT SERPL-MCNC: 7.4 G/DL (ref 6.4–8.2)
PROTHROMBIN TIME: 13.5 SECONDS (ref 11.6–14.5)
RBC # BLD AUTO: 4.72 MILLION/UL (ref 3.81–5.12)
SODIUM SERPL-SCNC: 142 MMOL/L (ref 136–145)
TIBC SERPL-MCNC: 296 UG/DL (ref 250–450)
VIT B12 SERPL-MCNC: 399 PG/ML (ref 100–900)
WBC # BLD AUTO: 7.02 THOUSAND/UL (ref 4.31–10.16)

## 2021-03-04 PROCEDURE — 82607 VITAMIN B-12: CPT

## 2021-03-04 PROCEDURE — 85245 CLOT FACTOR VIII VW RISTOCTN: CPT

## 2021-03-04 PROCEDURE — 85240 CLOT FACTOR VIII AHG 1 STAGE: CPT

## 2021-03-04 PROCEDURE — 85730 THROMBOPLASTIN TIME PARTIAL: CPT

## 2021-03-04 PROCEDURE — 86140 C-REACTIVE PROTEIN: CPT

## 2021-03-04 PROCEDURE — 80053 COMPREHEN METABOLIC PANEL: CPT

## 2021-03-04 PROCEDURE — 36415 COLL VENOUS BLD VENIPUNCTURE: CPT

## 2021-03-04 PROCEDURE — 85652 RBC SED RATE AUTOMATED: CPT

## 2021-03-04 PROCEDURE — 83550 IRON BINDING TEST: CPT

## 2021-03-04 PROCEDURE — 83540 ASSAY OF IRON: CPT

## 2021-03-04 PROCEDURE — 85384 FIBRINOGEN ACTIVITY: CPT

## 2021-03-04 PROCEDURE — 82728 ASSAY OF FERRITIN: CPT

## 2021-03-04 PROCEDURE — 85247 CLOT FACTOR VIII MULTIMETRIC: CPT

## 2021-03-04 PROCEDURE — 85610 PROTHROMBIN TIME: CPT

## 2021-03-04 PROCEDURE — 85025 COMPLETE CBC W/AUTO DIFF WBC: CPT

## 2021-03-04 PROCEDURE — 83918 ORGANIC ACIDS TOTAL QUANT: CPT

## 2021-03-04 PROCEDURE — 85246 CLOT FACTOR VIII VW ANTIGEN: CPT

## 2021-03-06 LAB
FACT XIIIA PPP-ACNC: 106 % (ref 56–140)
FACT XIIIA PPP-ACNC: 112 % (ref 56–140)
VWF AG ACT/NOR PPP IA: 148 % (ref 50–200)
VWF:RCO ACT/NOR PPP PL AGG: 134 % (ref 50–200)
VWF:RCO ACT/NOR PPP PL AGG: 147 % (ref 50–200)

## 2021-03-09 ENCOUNTER — OFFICE VISIT (OUTPATIENT)
Dept: HEMATOLOGY ONCOLOGY | Facility: CLINIC | Age: 41
End: 2021-03-09
Payer: OTHER GOVERNMENT

## 2021-03-09 VITALS
TEMPERATURE: 97.3 F | RESPIRATION RATE: 16 BRPM | HEIGHT: 67 IN | HEART RATE: 87 BPM | SYSTOLIC BLOOD PRESSURE: 128 MMHG | OXYGEN SATURATION: 99 % | BODY MASS INDEX: 24.61 KG/M2 | WEIGHT: 156.8 LBS | DIASTOLIC BLOOD PRESSURE: 78 MMHG

## 2021-03-09 DIAGNOSIS — D50.8 IRON DEFICIENCY ANEMIA SECONDARY TO INADEQUATE DIETARY IRON INTAKE: Primary | ICD-10-CM

## 2021-03-09 DIAGNOSIS — E53.8 B12 DEFICIENCY: ICD-10-CM

## 2021-03-09 DIAGNOSIS — Z98.84 H/O GASTRIC BYPASS: ICD-10-CM

## 2021-03-09 DIAGNOSIS — N92.0 MENORRHAGIA WITH REGULAR CYCLE: ICD-10-CM

## 2021-03-09 DIAGNOSIS — Z12.31 SCREENING MAMMOGRAM, ENCOUNTER FOR: ICD-10-CM

## 2021-03-09 PROCEDURE — 99214 OFFICE O/P EST MOD 30 MIN: CPT | Performed by: INTERNAL MEDICINE

## 2021-03-09 NOTE — PROGRESS NOTES
Hematology/Oncology Outpatient Follow-up  Robert Obrien 38 y o  female 1980 [id number]    Date:  3/9/2021    Assessment and Plan:  1  Iron deficiency anemia secondary to inadequate dietary iron intake    The patient does not seem to be anemic or iron deficient at this point in time  we will continue to monitor on every 4-6 months basis  - CBC and differential; Future  - Comprehensive metabolic panel; Future  - Iron Panel (Includes Ferritin, Iron Sat%, Iron, and TIBC); Future  - Ferritin; Future    2  B12 deficiency    She has low normal vitamin B12 level  I did advise her to get started on vitamin B12 supplements on a daily basis since she has a history of gastric bypass surgery  - CBC and differential; Future  - Comprehensive metabolic panel; Future  - Vitamin B12; Future    3  H/O gastric bypass    Patient with history of gastric bypass surgery are prone for vitamin B12  And iron deficiency  4  Menorrhagia with regular cycle   I did advise the patient to follow-up with the gyn team to consider endometrial ablation if he continues to have heavy menstrual bleeding  The initial workup did not reveal any hint of  Von Willebrand  Or bleeding disorder  5  Screening mammogram, encounter for  She did have a screening mammogram on 11/27/2020 which showed  Calcifications of the right  Breast   A close follow-up in 6 months was recommended        HPI:  Patient is a pleasant 41-year-old female who originally presented for further evaluation of her leukocytosis and iron deficiency   She has a past medical history of PCOS, GERD, arthritis, fibromyalgia, IBS, anxiety disorder, and Mona-en-Y gastric bypass surgery 2017  She is a smoker and smokes 1 pack per day for 20+ years   Denies ever being told that she has issues with her white cells up until recently   Was told that she had low iron with her 2nd pregnancy more than 20 years ago which was treated/corrected with oral iron   She had a history of 4 pregnancies resulting in live births and 3 miscarriages all occurring less than 10 weeks gestation   Patient denies bleeding from any site other than her menstrual cycles   Reports that her menstrual cycles are regular but heavy due to her PCOS; the typically last 7 days with the 1st 5 days being heavy with clots to the point she is changing her pad/tampon every 1 hour        Patient was found to have iron deficiency which was corrected with 2 doses of injectafer around March 2020            Interval history:   the patient came in today for a follow-up visit accompanied by her   She was treated with 2 more sessions of Feraheme IV around November of last year since she was iron deficient  Her most recent blood work from 03/04/2021 showed normal CBC with hemoglobin of 14 6 and MCV of 95  CMP was entirely normal   Vitamin B12 399  Ferritin was 86 with saturation of 43%  A bleeding disorder workup showed normal PT/PTT and normal von Willebrand factor activity, antigen levels and  multimeric level  She continues to complain about heavy menstrual bleeding and insomnia  ROS: Review of Systems   Constitutional: Positive for fatigue  Negative for chills and fever  HENT: Negative for ear pain and sore throat  Eyes: Negative for pain and visual disturbance  Respiratory: Positive for cough  Negative for shortness of breath  Cardiovascular: Negative for chest pain and palpitations  Gastrointestinal: Negative for abdominal pain and vomiting  Genitourinary: Negative for dysuria and hematuria  Musculoskeletal: Negative for arthralgias and back pain  Skin: Negative for color change and rash  Neurological: Positive for headaches  Negative for seizures and syncope  Psychiatric/Behavioral: Positive for sleep disturbance  All other systems reviewed and are negative        Past Medical History:   Diagnosis Date    Allergic 2019    Anemia 1999    During second pregnancy    Anxiety Years    I notice I get anxious over a lot of things    Arthritis 2007    Diabetes mellitus ([address] ) 2005,2013,2016    Two pregnancies and then in 2016    Fibromyalgia     GERD (gastroesophageal reflux disease) 2016    Obesity 2004    Restless leg syndrome     Shingles 2002       Past Surgical History:   Procedure Laterality Date    CHOLECYSTECTOMY      GASTRIC BYPASS         Social History     Socioeconomic History    Marital status: /Civil Union     Spouse name: None    Number of children: None    Years of education: None    Highest education level: None   Occupational History    None   Social Needs    Financial resource strain: None    Food insecurity     Worry: None     Inability: None    Transportation needs     Medical: None     Non-medical: None   Tobacco Use    Smoking status: Current Every Day Smoker     Packs/day: 1 00     Years: 25 00     Pack years: 25 00     Types: Cigarettes    Smokeless tobacco: Never Used   Substance and Sexual Activity    Alcohol use: Not Currently     Frequency: Never    Drug use: Never    Sexual activity: Yes     Partners: Male     Birth control/protection: Condom Male   Lifestyle    Physical activity     Days per week: None     Minutes per session: None    Stress: None   Relationships    Social connections     Talks on phone: None     Gets together: None     Attends [employer] service: None     Active member of club or organization: None     Attends meetings of clubs or organizations: None     Relationship status: None    Intimate partner violence     Fear of current or ex partner: None     Emotionally abused: None     Physically abused: None     Forced sexual activity: None   Other Topics Concern    None   Social History Narrative    None       Family History   Problem Relation Age of Onset    Heart disease Mother     Anxiety disorder Mother     ADD / ADHD Father     Bipolar disorder Father     Suicide Attempts Father     Dementia Paternal Grandmother    Julieann Frankel Cancer Maternal Grandmother         Lung cancer    COPD Maternal Uncle     Early death Maternal Grandfather         Heart attack 38yrs old    Stroke Maternal Grandfather     No Known Problems Sister     Breast cancer Maternal Aunt 39    No Known Problems Maternal Aunt     No Known Problems Paternal Aunt        No Known Allergies      Current Outpatient Medications:     acetaminophen-codeine (TYLENOL #3) 300-30 mg per tablet, Take 1 tablet by mouth every 4 to 6 hours if needed for pain, Disp: , Rfl:     Calcium-Phosphorus-Vitamin D (CITRACAL +D3 PO), Take by mouth, Disp: , Rfl:     Lactobacillus (PROBIOTIC ACIDOPHILUS) TABS, Take by mouth, Disp: , Rfl:     loratadine (CLARITIN) 10 mg tablet, Take 1 tablet (10 mg total) by mouth daily, Disp: 90 tablet, Rfl: 0    multivitamin (THERAGRAN) TABS, Take 1 tablet by mouth daily, Disp: , Rfl:     omeprazole (PriLOSEC) 20 mg delayed release capsule, Take 10 mg by mouth daily, Disp: , Rfl:     DULoxetine (CYMBALTA) 30 mg delayed release capsule, Take 1 capsule (30 mg total) by mouth every morning (Patient not taking: Reported on 2/21/2020), Disp: 30 capsule, Rfl: 1    QUEtiapine (SEROquel) 50 mg tablet, Take 1 tablet (50 mg total) by mouth daily at bedtime (Patient not taking: Reported on 11/3/2020), Disp: 90 tablet, Rfl: 0      Physical Exam:  /78 (BP Location: Left arm, Patient Position: Sitting, Cuff Size: Standard)   Pulse 87   Temp (!) 97 3 °F (36 3 °C) (Tympanic)   Resp 16   Ht 5' 7" (1 702 m)   Wt 71 1 kg (156 lb 12 8 oz)   SpO2 99%   BMI 24 56 kg/m²     Physical Exam  Constitutional:       General: She is not in acute distress  Appearance: She is well-developed  She is not diaphoretic  HENT:      Head: Normocephalic and atraumatic  Eyes:      General: No scleral icterus  Right eye: No discharge  Left eye: No discharge        Conjunctiva/sclera: Conjunctivae normal       Pupils: Pupils are equal, round, and reactive to light    Neck:      Musculoskeletal: Normal range of motion and neck supple  Thyroid: No thyromegaly  Vascular: No JVD  Trachea: No tracheal deviation  Cardiovascular:      Rate and Rhythm: Normal rate and regular rhythm  Heart sounds: Normal heart sounds  No murmur  No friction rub  Pulmonary:      Effort: Pulmonary effort is normal  No respiratory distress  Breath sounds: Normal breath sounds  No stridor  No wheezing or rales  Chest:      Chest wall: No tenderness  Abdominal:      General: There is no distension  Palpations: Abdomen is soft  There is no hepatomegaly or splenomegaly  Tenderness: There is no abdominal tenderness  There is no guarding or rebound  Musculoskeletal: Normal range of motion  General: No tenderness or deformity  Lymphadenopathy:      Cervical: No cervical adenopathy  Skin:     General: Skin is warm and dry  Coloration: Skin is not pale  Findings: No erythema or rash  Neurological:      Mental Status: She is alert and oriented to person, place, and time  Cranial Nerves: No cranial nerve deficit  Coordination: Coordination normal       Deep Tendon Reflexes: Reflexes are normal and symmetric  Psychiatric:         Behavior: Behavior normal          Thought Content: Thought content normal          Judgment: Judgment normal            Labs:  Lab Results   Component Value Date    WBC 7 02 03/04/2021    HGB 14 6 03/04/2021    HCT 44 8 03/04/2021    MCV 95 03/04/2021     03/04/2021     Lab Results   Component Value Date    K 4 1 03/04/2021     (H) 03/04/2021    CO2 26 03/04/2021    BUN 17 03/04/2021    CREATININE 0 70 03/04/2021    GLUF 91 10/30/2020    CALCIUM 9 2 03/04/2021    AST 11 03/04/2021    ALT 24 03/04/2021    ALKPHOS 104 03/04/2021    EGFR 108 03/04/2021       Patient voiced understanding and agreement in the above discussion  Aware to contact our office with questions/symptoms in the interim

## 2021-03-10 DIAGNOSIS — Z23 ENCOUNTER FOR IMMUNIZATION: ICD-10-CM

## 2021-03-10 LAB — FACT VIII AG ACT/NOR PPP IA: 122 %

## 2021-03-11 LAB
METHYLMALONATE SERPL-SCNC: 169 NMOL/L (ref 0–378)
SL AMB DISCLAIMER: NORMAL

## 2021-03-15 ENCOUNTER — OFFICE VISIT (OUTPATIENT)
Dept: OBGYN CLINIC | Facility: CLINIC | Age: 41
End: 2021-03-15
Payer: OTHER GOVERNMENT

## 2021-03-15 VITALS
SYSTOLIC BLOOD PRESSURE: 120 MMHG | DIASTOLIC BLOOD PRESSURE: 76 MMHG | BODY MASS INDEX: 24.83 KG/M2 | WEIGHT: 158.2 LBS | HEIGHT: 67 IN

## 2021-03-15 DIAGNOSIS — Z98.84 H/O GASTRIC BYPASS: ICD-10-CM

## 2021-03-15 DIAGNOSIS — F17.200 TOBACCO USE DISORDER: ICD-10-CM

## 2021-03-15 DIAGNOSIS — N92.0 MENORRHAGIA WITH REGULAR CYCLE: Primary | ICD-10-CM

## 2021-03-15 PROCEDURE — 99213 OFFICE O/P EST LOW 20 MIN: CPT | Performed by: ADVANCED PRACTICE MIDWIFE

## 2021-03-16 LAB — VWF MULTIMERS PPP IB: NORMAL

## 2021-03-18 NOTE — PROGRESS NOTES
[facility] [address], [place]    Assessment/Plan:  No problem-specific Assessment & Plan notes found for this encounter  Diagnoses and all orders for this visit:    Menorrhagia with regular cycle  -     US pelvis complete non OB; Future    Tobacco use disorder    H/O gastric bypass    - reviewed testing recommendations for ablation  Tubal ligation, endometrial biopsy, pelvic ultrasound  - Due to anxiety Brown Lackey would like to know if  had vasectomy whether she could go directly to ablation  We discussed option of having tubal done at same time as ablation  Will review with physicians in practice  - pelvic ultrasound ordered with call Brown Lackey with results and to discuss options to proceed with ablation    - Questions asked and answered  I have spent 20 minutes with Patient  today in which greater than 50% of this time was spent in counseling/coordination of care regarding Risks and benefits of tx options  Subjective:   Kolby Torres is a 38 y o  [id number] female  CC: heavy menses    HPI: Brown Lackey was referred to office by hem onc to discuss options for controlling heavy menses  Brown Lackey states that her menstrual cycles are regular but heavy due to her PCOS;  typically last 7 days with the 1st 5 days being heavy with clots to the point she is changing her pad/tampon every 1 hour  Brown Lackey had work-up for clotting disorders which were negative  She has hx of gastric bypass and treatment in 2020 for Iron deficiency anemia  We discussed option of ablation in 2020 and today would like to discuss procedure  ROS: Review of Systems   Constitutional: Positive for fatigue  Negative for activity change, appetite change and fever  Respiratory: Negative for cough and shortness of breath  Cardiovascular: Negative for chest pain, palpitations and leg swelling  Gastrointestinal: Negative for constipation and diarrhea     Endocrine: Positive for cold intolerance and heat intolerance  Genitourinary: Positive for menstrual problem  Negative for difficulty urinating, frequency, pelvic pain, vaginal discharge and vaginal pain  Neurological: Negative for light-headedness and headaches  Psychiatric/Behavioral: The patient is nervous/anxious  PFSH: The following portions of the patient's history were reviewed and updated as appropriate: allergies, current medications, past family history, past medical history, obstetric history, gynecologic history, past social history, past surgical history and problem list        Objective:  /76 (BP Location: Right arm, Patient Position: Sitting, Cuff Size: Standard)   Ht 5' 7" (1 702 m)   Wt 71 8 kg (158 lb 3 2 oz)   LMP 03/09/2021 (Exact Date)   BMI 24 78 kg/m²    Physical Exam  Constitutional:       Appearance: Normal appearance  Neck:      Musculoskeletal: Normal range of motion  Cardiovascular:      Rate and Rhythm: Regular rhythm  Heart sounds: Normal heart sounds  Pulmonary:      Effort: Pulmonary effort is normal       Breath sounds: Normal breath sounds  Musculoskeletal: Normal range of motion  Neurological:      Mental Status: She is alert     Psychiatric:         Mood and Affect: Mood normal          Behavior: Behavior normal          Judgment: Judgment normal

## 2021-03-31 ENCOUNTER — IMMUNIZATIONS (OUTPATIENT)
Dept: FAMILY MEDICINE CLINIC | Facility: HOSPITAL | Age: 41
End: 2021-03-31

## 2021-03-31 DIAGNOSIS — Z23 ENCOUNTER FOR IMMUNIZATION: Primary | ICD-10-CM

## 2021-03-31 PROCEDURE — 0001A SARS-COV-2 / COVID-19 MRNA VACCINE (PFIZER-BIONTECH) 30 MCG: CPT

## 2021-03-31 PROCEDURE — 91300 SARS-COV-2 / COVID-19 MRNA VACCINE (PFIZER-BIONTECH) 30 MCG: CPT

## 2021-04-25 ENCOUNTER — IMMUNIZATIONS (OUTPATIENT)
Dept: FAMILY MEDICINE CLINIC | Facility: HOSPITAL | Age: 41
End: 2021-04-25

## 2021-04-25 DIAGNOSIS — Z23 ENCOUNTER FOR IMMUNIZATION: Primary | ICD-10-CM

## 2021-04-25 PROCEDURE — 91300 SARS-COV-2 / COVID-19 MRNA VACCINE (PFIZER-BIONTECH) 30 MCG: CPT

## 2021-04-25 PROCEDURE — 0002A SARS-COV-2 / COVID-19 MRNA VACCINE (PFIZER-BIONTECH) 30 MCG: CPT

## 2021-07-19 ENCOUNTER — OFFICE VISIT (OUTPATIENT)
Dept: URGENT CARE | Facility: CLINIC | Age: 41
End: 2021-07-19
Payer: OTHER GOVERNMENT

## 2021-07-19 VITALS
OXYGEN SATURATION: 99 % | RESPIRATION RATE: 18 BRPM | WEIGHT: 155 LBS | HEART RATE: 81 BPM | SYSTOLIC BLOOD PRESSURE: 124 MMHG | TEMPERATURE: 98 F | BODY MASS INDEX: 24.91 KG/M2 | DIASTOLIC BLOOD PRESSURE: 66 MMHG | HEIGHT: 66 IN

## 2021-07-19 DIAGNOSIS — M77.8 TENDONITIS OF RADIAL STYLOID: Primary | ICD-10-CM

## 2021-07-19 PROCEDURE — 96374 THER/PROPH/DIAG INJ IV PUSH: CPT | Performed by: NURSE PRACTITIONER

## 2021-07-19 PROCEDURE — 99213 OFFICE O/P EST LOW 20 MIN: CPT | Performed by: NURSE PRACTITIONER

## 2021-07-19 RX ORDER — DEXAMETHASONE SODIUM PHOSPHATE 10 MG/ML
10 INJECTION, SOLUTION INTRAMUSCULAR; INTRAVENOUS ONCE
Status: COMPLETED | OUTPATIENT
Start: 2021-07-19 | End: 2021-07-19

## 2021-07-19 RX ADMIN — DEXAMETHASONE SODIUM PHOSPHATE 10 MG: 10 INJECTION, SOLUTION INTRAMUSCULAR; INTRAVENOUS at 13:40

## 2021-07-19 NOTE — PATIENT INSTRUCTIONS
The decadron (steroid) shot lasts about 3 days  Wear the wrist splint for comfort  If pain does not improve, follow-up with ortho  Tendinitis   WHAT YOU NEED TO KNOW:   What is tendinitis? Tendinitis is painful inflammation or breakdown of your tendons  It may also be called tendinopathy  Tendinitis often occurs in the knee, shoulder, ankle, hip, or elbow  What increases my risk for tendinitis? · Injury, overuse, or repeated movement of a joint     · Not warming up before exercise, or not resting enough between activities    · Use of shoes or exercise equipment that do not fit well    · Muscle weakness, balance problems, or poor posture    What are the signs and symptoms of tendinitis? You may have redness, pain, or swelling around your joint, tendon, or muscle  You may also have pain, stiffness, or decreased movement in your joint  How is tendinitis diagnosed? Your healthcare provider will check your range of motion of the affected joint  He may also lightly press on your tendon to check for pain  You may also need an ultrasound, x-ray, or MRI to show if you have tendinitis or another condition  How is tendinitis treated? · Pain medicines  such as NSAIDs and acetaminophen may decrease swelling and pain  These medicines are available without a doctor's order  Ask how much to take and when to take it  Follow directions  NSAIDs and acetaminophen may cause liver or kidney damage if not taken correctly  · Steroids  may be used to decrease pain and swelling  They may be given as a pill or as an injection into the affected area  Steroids are rarely used in children  · Surgery  may rarely be needed if other treatment does not work  How can I manage my symptoms? · Rest  your tendon as directed to help it heal  Ask your healthcare provider if you need to stop putting weight on your affected area  · Ice  helps decrease swelling and pain, and may help prevent tissue damage   Use an ice pack, or put crushed ice in a plastic bag  Cover it with a towel and place it on the affected area for 10 to 15 minutes every hour or as directed  · Support devices  such as a cane, splint, shoe insert, or brace may help reduce your pain  · Physical therapy  may be ordered by your healthcare provider  This may be used to teach you exercises to help improve movement and strength, and to decrease pain  You may also learn how to improve your posture, and how to lift or exercise correctly  How can I prevent tendinitis? · Warm up or stretch  before you exercise  · Exercise regularly  to strengthen the muscles around your joint  Ease into an exercise routine for the first 3 weeks to prevent another injury  Ask your healthcare provider about the best exercise plan for you  Rest fully between activities  · Use the right equipment  for sports and exercise  Wear braces or tape around weak joints as directed  When should I contact my healthcare provider? · You have increased pain even after you take medicine  · You have questions or concerns about your condition or care  When should I seek immediate help? · You have increased redness over the joint, or swelling in the joint  · You suddenly cannot move your joint  CARE AGREEMENT:   You have the right to help plan your care  Learn about your health condition and how it may be treated  Discuss treatment options with your healthcare providers to decide what care you want to receive  You always have the right to refuse treatment  The above information is an  only  It is not intended as medical advice for individual conditions or treatments  Talk to your doctor, nurse or pharmacist before following any medical regimen to see if it is safe and effective for you  © Copyright [employer] Information is for End User's use only and may not be sold, redistributed or otherwise used for commercial purposes   All illustrations and images included in [address] are the copyrighted property of [employer]  or [facility] [employer]erg[facility]

## 2021-07-19 NOTE — PROGRESS NOTES
[facility]        NAME: Len Jacques is a 42 y o  female  : 1980    MRN: [id number]  DATE: 2021  TIME: 1:49 PM      Assessment and Plan     Tendonitis of radial styloid [M77 8]  1  Tendonitis of radial styloid  Ambulatory referral to Orthopedic Surgery    dexamethasone (PF) (DECADRON) injection 10 mg     Patient fitted for a WTO right wrist splint (with the rigid thumb support) by nursing; tolerated well  Patient Instructions     Patient Instructions   The decadron (steroid) shot lasts about 3 days  Wear the wrist splint for comfort  If pain does not improve, follow-up with ortho  Tendinitis   WHAT YOU NEED TO KNOW:   What is tendinitis? Tendinitis is painful inflammation or breakdown of your tendons  It may also be called tendinopathy  Tendinitis often occurs in the knee, shoulder, ankle, hip, or elbow  What increases my risk for tendinitis? · Injury, overuse, or repeated movement of a joint     · Not warming up before exercise, or not resting enough between activities    · Use of shoes or exercise equipment that do not fit well    · Muscle weakness, balance problems, or poor posture    What are the signs and symptoms of tendinitis? You may have redness, pain, or swelling around your joint, tendon, or muscle  You may also have pain, stiffness, or decreased movement in your joint  How is tendinitis diagnosed? Your healthcare provider will check your range of motion of the affected joint  He may also lightly press on your tendon to check for pain  You may also need an ultrasound, x-ray, or MRI to show if you have tendinitis or another condition  How is tendinitis treated? · Pain medicines  such as NSAIDs and acetaminophen may decrease swelling and pain  These medicines are available without a doctor's order  Ask how much to take and when to take it  Follow directions  NSAIDs and acetaminophen may cause liver or kidney damage if not taken correctly       · Steroids  may be used to decrease pain and swelling  They may be given as a pill or as an injection into the affected area  Steroids are rarely used in children  · Surgery  may rarely be needed if other treatment does not work  How can I manage my symptoms? · Rest  your tendon as directed to help it heal  Ask your healthcare provider if you need to stop putting weight on your affected area  · Ice  helps decrease swelling and pain, and may help prevent tissue damage  Use an ice pack, or put crushed ice in a plastic bag  Cover it with a towel and place it on the affected area for 10 to 15 minutes every hour or as directed  · Support devices  such as a cane, splint, shoe insert, or brace may help reduce your pain  · Physical therapy  may be ordered by your healthcare provider  This may be used to teach you exercises to help improve movement and strength, and to decrease pain  You may also learn how to improve your posture, and how to lift or exercise correctly  How can I prevent tendinitis? · Warm up or stretch  before you exercise  · Exercise regularly  to strengthen the muscles around your joint  Ease into an exercise routine for the first 3 weeks to prevent another injury  Ask your healthcare provider about the best exercise plan for you  Rest fully between activities  · Use the right equipment  for sports and exercise  Wear braces or tape around weak joints as directed  When should I contact my healthcare provider? · You have increased pain even after you take medicine  · You have questions or concerns about your condition or care  When should I seek immediate help? · You have increased redness over the joint, or swelling in the joint  · You suddenly cannot move your joint  CARE AGREEMENT:   You have the right to help plan your care  Learn about your health condition and how it may be treated   Discuss treatment options with your healthcare providers to decide what care you want to receive  You always have the right to refuse treatment  The above information is an  only  It is not intended as medical advice for individual conditions or treatments  Talk to your doctor, nurse or pharmacist before following any medical regimen to see if it is safe and effective for you  © Copyright [address] Information is for End User's use only and may not be sold, redistributed or otherwise used for commercial purposes  All illustrations and images included in CareNotes® are the copyrighted property of [employer]  or [facility] [employer]         Follow up with PCP in 3-5 days  Proceed to  ER if symptoms worsen  Chief Complaint     Chief Complaint   Patient presents with    Hand Pain     right hand/wrist pain, weakness with swelling for 1 day         History of Present Illness     Patient reports that her right wrist started to feel sore and weak yesterday, worse today  She tried resting it and icing it, but the weight of the ice over the wrist hurt  She now has trace swelling over the wrist and hand when compared to the other side  Using it at all, making a fist, etc is too painful so she has been keeping it in a an open, neutral position and supporting it with her other hand  She is right hand dominant  No history of wrist or hand problems  No repetitive work-based tasks  She has an active household with 4 kids and dogs, so is frequently doing tasks there  She and her  recently found out that he is being transferred to [place] in December  He works long hours and commutes to [address], so a lot of the day to day maintenance and prep work falls to her  They have owned their current house for 8 years, and she shares that there is a lot to purge, prep, pack  She has started this process with more tasks than her already active lifestyle  She also recently got a new spin mop that she has been using  She has had prior gastric bypass    She took oral prednisone once for a dental problem, and had significant stomach pain after 2-3 days  She shares that she takes a daily prilosec anyway due to chronic stomach sensitivity  She would prefer an injected steroid to avoid GI pain  We did discuss the difference in length of action, but that she could combine the shorter acting injected steroid with the wrist brace to rest the joint  She is agreeable to this plan  Review of Systems     Review of Systems   Musculoskeletal: Positive for arthralgias and joint swelling  All other systems reviewed and are negative  Current Medications       Current Outpatient Medications:     Lactobacillus (PROBIOTIC ACIDOPHILUS) TABS, Take by mouth, Disp: , Rfl:     multivitamin (THERAGRAN) TABS, Take 1 tablet by mouth daily, Disp: , Rfl:     omeprazole (PriLOSEC) 20 mg delayed release capsule, Take 10 mg by mouth daily, Disp: , Rfl:     acetaminophen-codeine (TYLENOL #3) 300-30 mg per tablet, Take 1 tablet by mouth every 4 to 6 hours if needed for pain (Patient not taking: Reported on 7/19/2021), Disp: , Rfl:     Calcium-Phosphorus-Vitamin D (CITRACAL +D3 PO), Take by mouth (Patient not taking: Reported on 7/19/2021), Disp: , Rfl:     DULoxetine (CYMBALTA) 30 mg delayed release capsule, Take 1 capsule (30 mg total) by mouth every morning (Patient not taking: Reported on 2/21/2020), Disp: 30 capsule, Rfl: 1    loratadine (CLARITIN) 10 mg tablet, Take 1 tablet (10 mg total) by mouth daily, Disp: 90 tablet, Rfl: 0    QUEtiapine (SEROquel) 50 mg tablet, Take 1 tablet (50 mg total) by mouth daily at bedtime (Patient not taking: Reported on 11/3/2020), Disp: 90 tablet, Rfl: 0  No current facility-administered medications for this visit      Current Allergies     Allergies as of 07/19/2021    (No Known Allergies)              The following portions of the patient's history were reviewed and updated as appropriate: allergies, current medications, past family history, past medical history, of injury  Right wrist: Swelling (trace over wrist and hand) and tenderness (palpable tendonitis over the distal radius) present  No deformity, effusion, lacerations, bony tenderness or crepitus  Decreased range of motion  Normal pulse  Right hand: Swelling (trace swellin gin hands) present  No tenderness (no tenderness of hand itself, but movement worsens the wrist pain)  Decreased range of motion (due to wrist pain full movement causes)  Decreased strength (painful to perform a full hand grasp) of thumb/finger opposition and wrist extension  Normal sensation  There is no disruption of two-point discrimination  Normal pulse  Cervical back: Normal range of motion and neck supple  Skin:     General: Skin is warm and dry  Capillary Refill: Capillary refill takes less than 2 seconds  Neurological:      General: No focal deficit present  Mental Status: She is alert and oriented to person, place, and time  Psychiatric:         Mood and Affect: Mood normal          Behavior: Behavior normal          Thought Content:  Thought content normal          Judgment: Judgment normal

## 2021-11-30 ENCOUNTER — IMMUNIZATIONS (OUTPATIENT)
Dept: FAMILY MEDICINE CLINIC | Facility: HOSPITAL | Age: 41
End: 2021-11-30

## 2021-11-30 DIAGNOSIS — Z23 ENCOUNTER FOR IMMUNIZATION: Primary | ICD-10-CM

## 2021-11-30 PROCEDURE — 91306 COVID-19 MODERNA VACC 0.25 ML BOOSTER: CPT

## 2021-11-30 PROCEDURE — 0064A COVID-19 MODERNA VACC 0.25 ML BOOSTER: CPT
